# Patient Record
Sex: FEMALE | Race: OTHER | NOT HISPANIC OR LATINO
[De-identification: names, ages, dates, MRNs, and addresses within clinical notes are randomized per-mention and may not be internally consistent; named-entity substitution may affect disease eponyms.]

---

## 2021-01-27 ENCOUNTER — APPOINTMENT (OUTPATIENT)
Dept: ANTEPARTUM | Facility: CLINIC | Age: 36
End: 2021-01-27
Payer: COMMERCIAL

## 2021-01-27 ENCOUNTER — TRANSCRIPTION ENCOUNTER (OUTPATIENT)
Age: 36
End: 2021-01-27

## 2021-01-27 PROCEDURE — 76811 OB US DETAILED SNGL FETUS: CPT

## 2021-01-27 PROCEDURE — 99072 ADDL SUPL MATRL&STAF TM PHE: CPT

## 2021-02-03 ENCOUNTER — APPOINTMENT (OUTPATIENT)
Dept: ANTEPARTUM | Facility: CLINIC | Age: 36
End: 2021-02-03
Payer: COMMERCIAL

## 2021-02-03 PROCEDURE — 99072 ADDL SUPL MATRL&STAF TM PHE: CPT

## 2021-02-03 PROCEDURE — 76946 ECHO GUIDE FOR AMNIOCENTESIS: CPT

## 2021-02-03 PROCEDURE — 59000 AMNIOCENTESIS DIAGNOSTIC: CPT

## 2021-02-11 ENCOUNTER — APPOINTMENT (OUTPATIENT)
Dept: MATERNAL FETAL MEDICINE | Facility: CLINIC | Age: 36
End: 2021-02-11
Payer: COMMERCIAL

## 2021-02-11 PROBLEM — Z00.00 ENCOUNTER FOR PREVENTIVE HEALTH EXAMINATION: Status: ACTIVE | Noted: 2021-02-11

## 2021-02-11 PROCEDURE — 99205 OFFICE O/P NEW HI 60 MIN: CPT | Mod: 95

## 2021-02-11 PROCEDURE — 99215 OFFICE O/P EST HI 40 MIN: CPT | Mod: 95

## 2021-02-17 NOTE — LETTER CLOSING
[Thank you for sending this denisse, albeit complicated patient to us for MFM consultation.] : Thank you for sending this denisse, albeit complicated patient to us for MFM consultation. [If you have any questions, please do not hesitate to contact our office.] : If you have any questions, please do not hesitate to contact our office. [Sincerely,] : Sincerely,

## 2021-02-17 NOTE — DISCUSSION/SUMMARY
[FreeTextEntry1] : The incidence of NF1 in pregnancy is similar to that of the general population, ranging from approximately 1:3000 to 5000.  Increased rates of hypertensive disorders in pregnancy, fetal growth restriction, cerebrovascular disease,  labor and  delivery in patients with NF1 have been reported. There does NOT appear to be an increase in thromboembolism, acute cardiac events or maternal mortality. In terms of the effect of pregnancy on NF1, approximately 50 to 60% of patients experience growth of new or existing neurofibromas. There does appear to be a decrease in neurofibroma size in approximately 1/3 of patients postpartum, suggesting a hormonal influence on neurofibroma development.\par \par Ms. Johnson and her partner already underwent genetic counseling in this pregnancy and the results of the amniocentesis are pending. \par \par \par Recommendations:\par 1. Anesthesia consult. Will discuss need for spinal imaging for neuraxial anesthesia.\par 2. The lack of history of seizure, visual disturbances, hypertension and/or kyphoscoliosis is all reassuring. Suggest establishing care with PCP or physician with expertise/experience in caring for patients with NF1.\par 3. [employer] for surveillance of adults with NF1 suggest the following which are applicable to Ms. Johnson: Annual mammogram starting at age 30 years old and consideration for MRI between 30 to 50 years old. Mammogram and MRI can be safely performed in pregnancy and recommend screening in pregnancy rather than delaying until postpartum (may be beneficial to establish care with a breast specialist). Screen for pheochromocytoma if HTN or other sx associated with pheo such as palpitations, HA or diaphoresis. Some have also suggested routine screening prior to surgical procedures, pregnancy, labor and delivery -- as these procedures can trigger cardiovascular crisis. Suggest sending plasma-free fractionated metanephrines to r/o pheo. \par 3. Serial evaluation of fetal growth in the 3rd trimester was suggested due to the history of NF1\par 4. Screening for HTN at routine prenatal visits. Daily low-dose aspirin, 162 mg/day, to reduce the risk of preeclampsia.

## 2021-02-17 NOTE — HISTORY OF PRESENT ILLNESS
[FreeTextEntry1] : Maternal-Fetal Medicine telemedicine consultation \par Patient verbally consented to a telemedicine consultation\par Prenatal care: 1060, Dr. 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 Diagnosed as a teenager due to presence of Cafe ole spots. She reports normal eye exam in teenage years as well as a normal "body scan". She reports only cutaneous manifestations and has not required additional work up or testing. "No one really follows me for it". Reports providers were less concerned about NF1 because no issues developed in childhood/adolescence. She denies any history of seizures or visual disturbances. She denies any history of kyphoscoliosis. She denies a history of hypertension.\par \par Surgical history is notable for an appendectomy. Gynecologic history is unremarkable. Current medications include prenatal vitamins. Allergies: NKDA\par \par She is not working currently. She lives with her  and daughter. She denies a history of tobacco use. She denies alcohol or drug use in this pregnancy.  She has never received blood products but is willing to receive them if needed. \par \par Family history is unremarkable. She denies a family history of birth defects, mental retardation, developmental delay or genetic disorders. \par \par Height: 5'0" Prepregnancy weight: ~140-145#  \par Prepregnancy BMI: 28.3

## 2021-03-04 ENCOUNTER — APPOINTMENT (OUTPATIENT)
Dept: ANTEPARTUM | Facility: CLINIC | Age: 36
End: 2021-03-04
Payer: COMMERCIAL

## 2021-03-04 ENCOUNTER — ASOB RESULT (OUTPATIENT)
Age: 36
End: 2021-03-04

## 2021-03-04 PROCEDURE — 76816 OB US FOLLOW-UP PER FETUS: CPT

## 2021-03-04 PROCEDURE — 76819 FETAL BIOPHYS PROFIL W/O NST: CPT

## 2021-03-04 PROCEDURE — 99072 ADDL SUPL MATRL&STAF TM PHE: CPT

## 2021-04-15 ENCOUNTER — ASOB RESULT (OUTPATIENT)
Age: 36
End: 2021-04-15

## 2021-04-15 ENCOUNTER — APPOINTMENT (OUTPATIENT)
Dept: ANTEPARTUM | Facility: CLINIC | Age: 36
End: 2021-04-15
Payer: COMMERCIAL

## 2021-04-15 PROCEDURE — 76816 OB US FOLLOW-UP PER FETUS: CPT

## 2021-04-15 PROCEDURE — 99072 ADDL SUPL MATRL&STAF TM PHE: CPT

## 2021-04-15 PROCEDURE — 76819 FETAL BIOPHYS PROFIL W/O NST: CPT

## 2021-05-05 ENCOUNTER — TRANSCRIPTION ENCOUNTER (OUTPATIENT)
Age: 36
End: 2021-05-05

## 2021-05-06 ENCOUNTER — APPOINTMENT (OUTPATIENT)
Dept: ANTEPARTUM | Facility: CLINIC | Age: 36
End: 2021-05-06
Payer: COMMERCIAL

## 2021-05-06 ENCOUNTER — INPATIENT (INPATIENT)
Facility: HOSPITAL | Age: 36
LOS: 6 days | Discharge: HOME | End: 2021-05-13
Attending: OBSTETRICS & GYNECOLOGY | Admitting: OBSTETRICS & GYNECOLOGY
Payer: COMMERCIAL

## 2021-05-06 ENCOUNTER — RESULT REVIEW (OUTPATIENT)
Age: 36
End: 2021-05-06

## 2021-05-06 ENCOUNTER — ASOB RESULT (OUTPATIENT)
Age: 36
End: 2021-05-06

## 2021-05-06 VITALS
RESPIRATION RATE: 20 BRPM | OXYGEN SATURATION: 99 % | DIASTOLIC BLOOD PRESSURE: 68 MMHG | TEMPERATURE: 98 F | SYSTOLIC BLOOD PRESSURE: 106 MMHG | HEART RATE: 89 BPM

## 2021-05-06 DIAGNOSIS — Z90.49 ACQUIRED ABSENCE OF OTHER SPECIFIED PARTS OF DIGESTIVE TRACT: ICD-10-CM

## 2021-05-06 DIAGNOSIS — O26.899 OTHER SPECIFIED PREGNANCY RELATED CONDITIONS, UNSPECIFIED TRIMESTER: ICD-10-CM

## 2021-05-06 DIAGNOSIS — O34.211 MATERNAL CARE FOR LOW TRANSVERSE SCAR FROM PREVIOUS CESAREAN DELIVERY: ICD-10-CM

## 2021-05-06 DIAGNOSIS — T42.6X5A ADVERSE EFFECT OF OTHER ANTIEPILEPTIC AND SEDATIVE-HYPNOTIC DRUGS, INITIAL ENCOUNTER: ICD-10-CM

## 2021-05-06 DIAGNOSIS — O26.893 OTHER SPECIFIED PREGNANCY RELATED CONDITIONS, THIRD TRIMESTER: ICD-10-CM

## 2021-05-06 DIAGNOSIS — Z28.09 IMMUNIZATION NOT CARRIED OUT BECAUSE OF OTHER CONTRAINDICATION: ICD-10-CM

## 2021-05-06 DIAGNOSIS — O34.219 MATERNAL CARE FOR UNSPECIFIED TYPE SCAR FROM PREVIOUS CESAREAN DELIVERY: ICD-10-CM

## 2021-05-06 DIAGNOSIS — O36.5930 MATERNAL CARE FOR OTHER KNOWN OR SUSPECTED POOR FETAL GROWTH, THIRD TRIMESTER, NOT APPLICABLE OR UNSPECIFIED: ICD-10-CM

## 2021-05-06 DIAGNOSIS — R74.01 ELEVATION OF LEVELS OF LIVER TRANSAMINASE LEVELS: ICD-10-CM

## 2021-05-06 DIAGNOSIS — O34.03 MATERNAL CARE FOR UNSPECIFIED CONGENITAL MALFORMATION OF UTERUS, THIRD TRIMESTER: ICD-10-CM

## 2021-05-06 DIAGNOSIS — Q51.810 ARCUATE UTERUS: ICD-10-CM

## 2021-05-06 DIAGNOSIS — N17.9 ACUTE KIDNEY FAILURE, UNSPECIFIED: ICD-10-CM

## 2021-05-06 DIAGNOSIS — Z3A.36 36 WEEKS GESTATION OF PREGNANCY: ICD-10-CM

## 2021-05-06 DIAGNOSIS — Z3A.00 WEEKS OF GESTATION OF PREGNANCY NOT SPECIFIED: ICD-10-CM

## 2021-05-06 LAB
BASOPHILS # BLD AUTO: 0.04 K/UL — SIGNIFICANT CHANGE UP (ref 0–0.2)
BASOPHILS NFR BLD AUTO: 0.4 % — SIGNIFICANT CHANGE UP (ref 0–2)
BLD GP AB SCN SERPL QL: NEGATIVE — SIGNIFICANT CHANGE UP
EOSINOPHIL # BLD AUTO: 0.02 K/UL — SIGNIFICANT CHANGE UP (ref 0–0.5)
EOSINOPHIL NFR BLD AUTO: 0.2 % — SIGNIFICANT CHANGE UP (ref 0–6)
GLUCOSE BLDC GLUCOMTR-MCNC: 75 MG/DL — SIGNIFICANT CHANGE UP (ref 70–99)
HCT VFR BLD CALC: 43.6 % — SIGNIFICANT CHANGE UP (ref 34.5–45)
HGB BLD-MCNC: 14.9 G/DL — SIGNIFICANT CHANGE UP (ref 11.5–15.5)
IMM GRANULOCYTES NFR BLD AUTO: 0.8 % — SIGNIFICANT CHANGE UP (ref 0–1.5)
LYMPHOCYTES # BLD AUTO: 2.16 K/UL — SIGNIFICANT CHANGE UP (ref 1–3.3)
LYMPHOCYTES # BLD AUTO: 20.8 % — SIGNIFICANT CHANGE UP (ref 13–44)
MCHC RBC-ENTMCNC: 30 PG — SIGNIFICANT CHANGE UP (ref 27–34)
MCHC RBC-ENTMCNC: 34.2 GM/DL — SIGNIFICANT CHANGE UP (ref 32–36)
MCV RBC AUTO: 87.9 FL — SIGNIFICANT CHANGE UP (ref 80–100)
MONOCYTES # BLD AUTO: 0.58 K/UL — SIGNIFICANT CHANGE UP (ref 0–0.9)
MONOCYTES NFR BLD AUTO: 5.6 % — SIGNIFICANT CHANGE UP (ref 2–14)
NEUTROPHILS # BLD AUTO: 7.49 K/UL — HIGH (ref 1.8–7.4)
NEUTROPHILS NFR BLD AUTO: 72.2 % — SIGNIFICANT CHANGE UP (ref 43–77)
NRBC # BLD: 0 /100 WBCS — SIGNIFICANT CHANGE UP (ref 0–0)
PLATELET # BLD AUTO: 134 K/UL — LOW (ref 150–400)
RBC # BLD: 4.96 M/UL — SIGNIFICANT CHANGE UP (ref 3.8–5.2)
RBC # FLD: 13.2 % — SIGNIFICANT CHANGE UP (ref 10.3–14.5)
RH IG SCN BLD-IMP: POSITIVE — SIGNIFICANT CHANGE UP
SARS-COV-2 RNA SPEC QL NAA+PROBE: SIGNIFICANT CHANGE UP
WBC # BLD: 10.37 K/UL — SIGNIFICANT CHANGE UP (ref 3.8–10.5)
WBC # FLD AUTO: 10.37 K/UL — SIGNIFICANT CHANGE UP (ref 3.8–10.5)

## 2021-05-06 PROCEDURE — 76816 OB US FOLLOW-UP PER FETUS: CPT

## 2021-05-06 PROCEDURE — 99072 ADDL SUPL MATRL&STAF TM PHE: CPT

## 2021-05-06 PROCEDURE — 76818 FETAL BIOPHYS PROFILE W/NST: CPT

## 2021-05-06 PROCEDURE — 88307 TISSUE EXAM BY PATHOLOGIST: CPT | Mod: 26

## 2021-05-06 RX ORDER — OXYCODONE HYDROCHLORIDE 5 MG/1
5 TABLET ORAL
Refills: 0 | Status: DISCONTINUED | OUTPATIENT
Start: 2021-05-06 | End: 2021-05-13

## 2021-05-06 RX ORDER — CEFAZOLIN SODIUM 1 G
2000 VIAL (EA) INJECTION ONCE
Refills: 0 | Status: DISCONTINUED | OUTPATIENT
Start: 2021-05-06 | End: 2021-05-06

## 2021-05-06 RX ORDER — LANOLIN
1 OINTMENT (GRAM) TOPICAL EVERY 6 HOURS
Refills: 0 | Status: DISCONTINUED | OUTPATIENT
Start: 2021-05-06 | End: 2021-05-13

## 2021-05-06 RX ORDER — IBUPROFEN 200 MG
600 TABLET ORAL EVERY 6 HOURS
Refills: 0 | Status: COMPLETED | OUTPATIENT
Start: 2021-05-06 | End: 2022-04-04

## 2021-05-06 RX ORDER — KETOROLAC TROMETHAMINE 30 MG/ML
30 SYRINGE (ML) INJECTION EVERY 6 HOURS
Refills: 0 | Status: DISCONTINUED | OUTPATIENT
Start: 2021-05-06 | End: 2021-05-07

## 2021-05-06 RX ORDER — SODIUM CHLORIDE 9 MG/ML
1000 INJECTION, SOLUTION INTRAVENOUS
Refills: 0 | Status: DISCONTINUED | OUTPATIENT
Start: 2021-05-06 | End: 2021-05-10

## 2021-05-06 RX ORDER — OXYCODONE HYDROCHLORIDE 5 MG/1
5 TABLET ORAL ONCE
Refills: 0 | Status: DISCONTINUED | OUTPATIENT
Start: 2021-05-06 | End: 2021-05-13

## 2021-05-06 RX ORDER — OXYTOCIN 10 UNIT/ML
333.33 VIAL (ML) INJECTION
Qty: 20 | Refills: 0 | Status: DISCONTINUED | OUTPATIENT
Start: 2021-05-06 | End: 2021-05-06

## 2021-05-06 RX ORDER — ENOXAPARIN SODIUM 100 MG/ML
40 INJECTION SUBCUTANEOUS EVERY 24 HOURS
Refills: 0 | Status: DISCONTINUED | OUTPATIENT
Start: 2021-05-07 | End: 2021-05-11

## 2021-05-06 RX ORDER — ACETAMINOPHEN 500 MG
975 TABLET ORAL
Refills: 0 | Status: DISCONTINUED | OUTPATIENT
Start: 2021-05-06 | End: 2021-05-13

## 2021-05-06 RX ORDER — SIMETHICONE 80 MG/1
80 TABLET, CHEWABLE ORAL EVERY 4 HOURS
Refills: 0 | Status: DISCONTINUED | OUTPATIENT
Start: 2021-05-06 | End: 2021-05-13

## 2021-05-06 RX ORDER — OXYTOCIN 10 UNIT/ML
333.33 VIAL (ML) INJECTION
Qty: 20 | Refills: 0 | Status: DISCONTINUED | OUTPATIENT
Start: 2021-05-06 | End: 2021-05-13

## 2021-05-06 RX ORDER — SODIUM CHLORIDE 9 MG/ML
1000 INJECTION, SOLUTION INTRAVENOUS
Refills: 0 | Status: DISCONTINUED | OUTPATIENT
Start: 2021-05-06 | End: 2021-05-06

## 2021-05-06 RX ORDER — MAGNESIUM HYDROXIDE 400 MG/1
30 TABLET, CHEWABLE ORAL
Refills: 0 | Status: DISCONTINUED | OUTPATIENT
Start: 2021-05-06 | End: 2021-05-13

## 2021-05-06 RX ORDER — DIPHENHYDRAMINE HCL 50 MG
25 CAPSULE ORAL EVERY 6 HOURS
Refills: 0 | Status: DISCONTINUED | OUTPATIENT
Start: 2021-05-06 | End: 2021-05-13

## 2021-05-06 RX ORDER — CITRIC ACID/SODIUM CITRATE 300-500 MG
30 SOLUTION, ORAL ORAL ONCE
Refills: 0 | Status: DISCONTINUED | OUTPATIENT
Start: 2021-05-06 | End: 2021-05-06

## 2021-05-06 RX ORDER — FAMOTIDINE 10 MG/ML
20 INJECTION INTRAVENOUS ONCE
Refills: 0 | Status: DISCONTINUED | OUTPATIENT
Start: 2021-05-06 | End: 2021-05-06

## 2021-05-06 RX ORDER — SODIUM CHLORIDE 9 MG/ML
1000 INJECTION, SOLUTION INTRAVENOUS ONCE
Refills: 0 | Status: DISCONTINUED | OUTPATIENT
Start: 2021-05-06 | End: 2021-05-06

## 2021-05-06 RX ORDER — TETANUS TOXOID, REDUCED DIPHTHERIA TOXOID AND ACELLULAR PERTUSSIS VACCINE, ADSORBED 5; 2.5; 8; 8; 2.5 [IU]/.5ML; [IU]/.5ML; UG/.5ML; UG/.5ML; UG/.5ML
0.5 SUSPENSION INTRAMUSCULAR ONCE
Refills: 0 | Status: DISCONTINUED | OUTPATIENT
Start: 2021-05-06 | End: 2021-05-13

## 2021-05-06 RX ADMIN — Medication 975 MILLIGRAM(S): at 18:36

## 2021-05-06 RX ADMIN — Medication 1000 MILLIUNIT(S)/MIN: at 14:16

## 2021-05-06 RX ADMIN — Medication 30 MILLIGRAM(S): at 15:09

## 2021-05-06 RX ADMIN — Medication 30 MILLIGRAM(S): at 21:33

## 2021-05-06 RX ADMIN — FAMOTIDINE 20 MILLIGRAM(S): 10 INJECTION INTRAVENOUS at 12:15

## 2021-05-06 RX ADMIN — Medication 30 MILLILITER(S): at 12:15

## 2021-05-06 RX ADMIN — Medication 30 MILLIGRAM(S): at 21:03

## 2021-05-06 RX ADMIN — Medication 30 MILLIGRAM(S): at 15:48

## 2021-05-06 RX ADMIN — Medication 975 MILLIGRAM(S): at 19:20

## 2021-05-06 RX ADMIN — Medication 100 MILLIGRAM(S): at 12:15

## 2021-05-06 NOTE — PATIENT PROFILE OB - VISION (WITH CORRECTIVE LENSES IF THE PATIENT USUALLY WEARS THEM):
Normal vision: sees adequately in most situations; can see medication labels, newsprint
no rash/no itching/no dryness

## 2021-05-07 LAB
ALBUMIN SERPL ELPH-MCNC: 3.3 G/DL — SIGNIFICANT CHANGE UP (ref 3.3–5)
ALP SERPL-CCNC: 116 U/L — SIGNIFICANT CHANGE UP (ref 40–120)
ALT FLD-CCNC: 23 U/L — SIGNIFICANT CHANGE UP (ref 10–45)
ANION GAP SERPL CALC-SCNC: 11 MMOL/L — SIGNIFICANT CHANGE UP (ref 5–17)
APTT BLD: 31.5 SEC — SIGNIFICANT CHANGE UP (ref 27.5–35.5)
AST SERPL-CCNC: 30 U/L — SIGNIFICANT CHANGE UP (ref 10–40)
BASOPHILS # BLD AUTO: 0.02 K/UL — SIGNIFICANT CHANGE UP (ref 0–0.2)
BASOPHILS NFR BLD AUTO: 0.1 % — SIGNIFICANT CHANGE UP (ref 0–2)
BILIRUB SERPL-MCNC: 0.2 MG/DL — SIGNIFICANT CHANGE UP (ref 0.2–1.2)
BUN SERPL-MCNC: 12 MG/DL — SIGNIFICANT CHANGE UP (ref 7–23)
CALCIUM SERPL-MCNC: 9.4 MG/DL — SIGNIFICANT CHANGE UP (ref 8.4–10.5)
CHLORIDE SERPL-SCNC: 104 MMOL/L — SIGNIFICANT CHANGE UP (ref 96–108)
CO2 SERPL-SCNC: 20 MMOL/L — LOW (ref 22–31)
COVID-19 SPIKE DOMAIN AB INTERP: POSITIVE
COVID-19 SPIKE DOMAIN ANTIBODY RESULT: >250 U/ML — HIGH
CREAT SERPL-MCNC: 0.78 MG/DL — SIGNIFICANT CHANGE UP (ref 0.5–1.3)
EOSINOPHIL # BLD AUTO: 0.01 K/UL — SIGNIFICANT CHANGE UP (ref 0–0.5)
EOSINOPHIL NFR BLD AUTO: 0.1 % — SIGNIFICANT CHANGE UP (ref 0–6)
FIBRINOGEN PPP-MCNC: 317 MG/DL — SIGNIFICANT CHANGE UP (ref 258–438)
GLUCOSE SERPL-MCNC: 85 MG/DL — SIGNIFICANT CHANGE UP (ref 70–99)
HCT VFR BLD CALC: 35.9 % — SIGNIFICANT CHANGE UP (ref 34.5–45)
HGB BLD-MCNC: 12 G/DL — SIGNIFICANT CHANGE UP (ref 11.5–15.5)
IMM GRANULOCYTES NFR BLD AUTO: 1 % — SIGNIFICANT CHANGE UP (ref 0–1.5)
INR BLD: 0.94 — SIGNIFICANT CHANGE UP (ref 0.88–1.16)
LDH SERPL L TO P-CCNC: 266 U/L — HIGH (ref 50–242)
LYMPHOCYTES # BLD AUTO: 1.72 K/UL — SIGNIFICANT CHANGE UP (ref 1–3.3)
LYMPHOCYTES # BLD AUTO: 12.4 % — LOW (ref 13–44)
MCHC RBC-ENTMCNC: 29.7 PG — SIGNIFICANT CHANGE UP (ref 27–34)
MCHC RBC-ENTMCNC: 33.4 GM/DL — SIGNIFICANT CHANGE UP (ref 32–36)
MCV RBC AUTO: 88.9 FL — SIGNIFICANT CHANGE UP (ref 80–100)
MONOCYTES # BLD AUTO: 1.09 K/UL — HIGH (ref 0–0.9)
MONOCYTES NFR BLD AUTO: 7.9 % — SIGNIFICANT CHANGE UP (ref 2–14)
NEUTROPHILS # BLD AUTO: 10.85 K/UL — HIGH (ref 1.8–7.4)
NEUTROPHILS NFR BLD AUTO: 78.5 % — HIGH (ref 43–77)
NRBC # BLD: 0 /100 WBCS — SIGNIFICANT CHANGE UP (ref 0–0)
PLATELET # BLD AUTO: 111 K/UL — LOW (ref 150–400)
POTASSIUM SERPL-MCNC: 4.6 MMOL/L — SIGNIFICANT CHANGE UP (ref 3.5–5.3)
POTASSIUM SERPL-SCNC: 4.6 MMOL/L — SIGNIFICANT CHANGE UP (ref 3.5–5.3)
PROT SERPL-MCNC: 5.9 G/DL — LOW (ref 6–8.3)
PROTHROM AB SERPL-ACNC: 11.3 SEC — SIGNIFICANT CHANGE UP (ref 10.6–13.6)
RBC # BLD: 4.04 M/UL — SIGNIFICANT CHANGE UP (ref 3.8–5.2)
RBC # FLD: 13 % — SIGNIFICANT CHANGE UP (ref 10.3–14.5)
SARS-COV-2 IGG+IGM SERPL QL IA: >250 U/ML — HIGH
SARS-COV-2 IGG+IGM SERPL QL IA: POSITIVE
SODIUM SERPL-SCNC: 135 MMOL/L — SIGNIFICANT CHANGE UP (ref 135–145)
T PALLIDUM AB TITR SER: NEGATIVE — SIGNIFICANT CHANGE UP
URATE SERPL-MCNC: 5.6 MG/DL — SIGNIFICANT CHANGE UP (ref 2.5–7)
WBC # BLD: 13.83 K/UL — HIGH (ref 3.8–10.5)
WBC # FLD AUTO: 13.83 K/UL — HIGH (ref 3.8–10.5)

## 2021-05-07 RX ORDER — IBUPROFEN 200 MG
600 TABLET ORAL EVERY 6 HOURS
Refills: 0 | Status: DISCONTINUED | OUTPATIENT
Start: 2021-05-07 | End: 2021-05-11

## 2021-05-07 RX ADMIN — Medication 975 MILLIGRAM(S): at 06:39

## 2021-05-07 RX ADMIN — Medication 600 MILLIGRAM(S): at 10:11

## 2021-05-07 RX ADMIN — Medication 975 MILLIGRAM(S): at 01:00

## 2021-05-07 RX ADMIN — Medication 600 MILLIGRAM(S): at 09:15

## 2021-05-07 RX ADMIN — Medication 30 MILLIGRAM(S): at 03:52

## 2021-05-07 RX ADMIN — Medication 600 MILLIGRAM(S): at 20:58

## 2021-05-07 RX ADMIN — Medication 600 MILLIGRAM(S): at 21:58

## 2021-05-07 RX ADMIN — Medication 975 MILLIGRAM(S): at 00:00

## 2021-05-07 RX ADMIN — Medication 975 MILLIGRAM(S): at 12:36

## 2021-05-07 RX ADMIN — ENOXAPARIN SODIUM 40 MILLIGRAM(S): 100 INJECTION SUBCUTANEOUS at 05:39

## 2021-05-07 RX ADMIN — Medication 975 MILLIGRAM(S): at 11:49

## 2021-05-07 RX ADMIN — Medication 600 MILLIGRAM(S): at 14:57

## 2021-05-07 RX ADMIN — Medication 600 MILLIGRAM(S): at 15:54

## 2021-05-07 RX ADMIN — Medication 1 TABLET(S): at 11:49

## 2021-05-07 RX ADMIN — Medication 30 MILLIGRAM(S): at 02:52

## 2021-05-07 RX ADMIN — Medication 975 MILLIGRAM(S): at 19:20

## 2021-05-07 RX ADMIN — Medication 975 MILLIGRAM(S): at 05:39

## 2021-05-07 RX ADMIN — Medication 975 MILLIGRAM(S): at 18:32

## 2021-05-07 NOTE — PROGRESS NOTE ADULT - ASSESSMENT
A/P   35y  s/p  section, POD #1, stable  -  Pain: PO motrin and tylenol, oxycodone PRN  -- gHTN: mild range BP, no toxic sx, full labs this AM  -  Post-operatively labs: post-op Hgb , hemodynamically stable, no symptoms of anemia   -  GI: tolerating clears, passing gas, ADAT  -  : valle in situ; DC this AM, f/u TOV  -  DVT prophylaxis: ambulation, SCDs, lovenox  -  Dispo: POD 3 or 4

## 2021-05-07 NOTE — LACTATION INITIAL EVALUATION - LACTATION INTERVENTIONS
Mom needs to arrange to get a breast pump for home. I encouraged her to contact her [employer] and get a prescription from her OB/initiate skin to skin/initiate hand expression routine/initiate dual electric pump routine

## 2021-05-08 RX ADMIN — Medication 600 MILLIGRAM(S): at 08:47

## 2021-05-08 RX ADMIN — Medication 975 MILLIGRAM(S): at 07:17

## 2021-05-08 RX ADMIN — Medication 975 MILLIGRAM(S): at 06:20

## 2021-05-08 RX ADMIN — Medication 975 MILLIGRAM(S): at 19:30

## 2021-05-08 RX ADMIN — Medication 1 TABLET(S): at 11:33

## 2021-05-08 RX ADMIN — Medication 975 MILLIGRAM(S): at 01:14

## 2021-05-08 RX ADMIN — Medication 600 MILLIGRAM(S): at 04:01

## 2021-05-08 RX ADMIN — ENOXAPARIN SODIUM 40 MILLIGRAM(S): 100 INJECTION SUBCUTANEOUS at 06:21

## 2021-05-08 RX ADMIN — Medication 975 MILLIGRAM(S): at 23:55

## 2021-05-08 RX ADMIN — Medication 600 MILLIGRAM(S): at 03:28

## 2021-05-08 RX ADMIN — Medication 975 MILLIGRAM(S): at 18:32

## 2021-05-08 RX ADMIN — Medication 600 MILLIGRAM(S): at 15:12

## 2021-05-08 RX ADMIN — Medication 975 MILLIGRAM(S): at 00:14

## 2021-05-08 RX ADMIN — Medication 975 MILLIGRAM(S): at 11:33

## 2021-05-08 RX ADMIN — Medication 600 MILLIGRAM(S): at 21:30

## 2021-05-08 RX ADMIN — Medication 600 MILLIGRAM(S): at 09:31

## 2021-05-08 RX ADMIN — Medication 975 MILLIGRAM(S): at 12:19

## 2021-05-08 RX ADMIN — Medication 600 MILLIGRAM(S): at 20:28

## 2021-05-08 RX ADMIN — Medication 600 MILLIGRAM(S): at 15:45

## 2021-05-08 NOTE — PROGRESS NOTE ADULT - ASSESSMENT
A/P: 35y s/p  section, POD#2, stable  -  Pain: PO motrin q6hrs, tylenol q8hrs, oxycodone for severe pain PRN  -  Post-operatively labs: post-op Hgb 12.0, hemodynamically stable, no symptoms of anemia   -  GI: tolerating regular diet, passing gas  -  : s/p valle , urinating without difficulty  -  DVT prophylaxis: encouraged increased ambulation, SCDs, Lovenox  -  Dispo: POD 3 or 4

## 2021-05-09 RX ADMIN — Medication 975 MILLIGRAM(S): at 00:55

## 2021-05-09 RX ADMIN — Medication 975 MILLIGRAM(S): at 18:30

## 2021-05-09 RX ADMIN — SIMETHICONE 80 MILLIGRAM(S): 80 TABLET, CHEWABLE ORAL at 08:48

## 2021-05-09 RX ADMIN — Medication 600 MILLIGRAM(S): at 20:35

## 2021-05-09 RX ADMIN — ENOXAPARIN SODIUM 40 MILLIGRAM(S): 100 INJECTION SUBCUTANEOUS at 06:23

## 2021-05-09 RX ADMIN — Medication 600 MILLIGRAM(S): at 09:30

## 2021-05-09 RX ADMIN — Medication 600 MILLIGRAM(S): at 15:30

## 2021-05-09 RX ADMIN — Medication 975 MILLIGRAM(S): at 06:18

## 2021-05-09 RX ADMIN — Medication 975 MILLIGRAM(S): at 12:30

## 2021-05-09 RX ADMIN — Medication 600 MILLIGRAM(S): at 09:00

## 2021-05-09 RX ADMIN — Medication 975 MILLIGRAM(S): at 07:10

## 2021-05-09 RX ADMIN — Medication 600 MILLIGRAM(S): at 15:00

## 2021-05-09 RX ADMIN — SIMETHICONE 80 MILLIGRAM(S): 80 TABLET, CHEWABLE ORAL at 15:00

## 2021-05-09 RX ADMIN — Medication 1 TABLET(S): at 11:24

## 2021-05-09 RX ADMIN — Medication 600 MILLIGRAM(S): at 21:35

## 2021-05-09 RX ADMIN — Medication 975 MILLIGRAM(S): at 18:00

## 2021-05-09 RX ADMIN — Medication 975 MILLIGRAM(S): at 12:00

## 2021-05-09 NOTE — PROGRESS NOTE ADULT - ASSESSMENT
A/P: 36yo POD#3 s/p LTCS.  Patient is stable and is doing well post-operatively.  - Continue motrin, tylenol, oxycodone PRN for pain control.  - Increase ambulation; lovenox   - Continue regular diet  - Discharge planning    Darren Dotson PGY2

## 2021-05-10 LAB
ALBUMIN SERPL ELPH-MCNC: 3 G/DL — LOW (ref 3.3–5)
ALBUMIN SERPL ELPH-MCNC: 3.3 G/DL — SIGNIFICANT CHANGE UP (ref 3.3–5)
ALP SERPL-CCNC: 138 U/L — HIGH (ref 40–120)
ALP SERPL-CCNC: 155 U/L — HIGH (ref 40–120)
ALT FLD-CCNC: 36 U/L — SIGNIFICANT CHANGE UP (ref 10–45)
ALT FLD-CCNC: 49 U/L — HIGH (ref 10–45)
ANION GAP SERPL CALC-SCNC: 12 MMOL/L — SIGNIFICANT CHANGE UP (ref 5–17)
ANION GAP SERPL CALC-SCNC: 15 MMOL/L — SIGNIFICANT CHANGE UP (ref 5–17)
AST SERPL-CCNC: 35 U/L — SIGNIFICANT CHANGE UP (ref 10–40)
AST SERPL-CCNC: 45 U/L — HIGH (ref 10–40)
BILIRUB SERPL-MCNC: 0.3 MG/DL — SIGNIFICANT CHANGE UP (ref 0.2–1.2)
BILIRUB SERPL-MCNC: 0.3 MG/DL — SIGNIFICANT CHANGE UP (ref 0.2–1.2)
BUN SERPL-MCNC: 20 MG/DL — SIGNIFICANT CHANGE UP (ref 7–23)
BUN SERPL-MCNC: 23 MG/DL — SIGNIFICANT CHANGE UP (ref 7–23)
CALCIUM SERPL-MCNC: 8.5 MG/DL — SIGNIFICANT CHANGE UP (ref 8.4–10.5)
CALCIUM SERPL-MCNC: 8.8 MG/DL — SIGNIFICANT CHANGE UP (ref 8.4–10.5)
CHLORIDE SERPL-SCNC: 107 MMOL/L — SIGNIFICANT CHANGE UP (ref 96–108)
CHLORIDE SERPL-SCNC: 111 MMOL/L — HIGH (ref 96–108)
CO2 SERPL-SCNC: 20 MMOL/L — LOW (ref 22–31)
CO2 SERPL-SCNC: 21 MMOL/L — LOW (ref 22–31)
CREAT SERPL-MCNC: 1.22 MG/DL — SIGNIFICANT CHANGE UP (ref 0.5–1.3)
CREAT SERPL-MCNC: 1.23 MG/DL — SIGNIFICANT CHANGE UP (ref 0.5–1.3)
CREAT SERPL-MCNC: 1.24 MG/DL — SIGNIFICANT CHANGE UP (ref 0.5–1.3)
GLUCOSE SERPL-MCNC: 138 MG/DL — HIGH (ref 70–99)
GLUCOSE SERPL-MCNC: 86 MG/DL — SIGNIFICANT CHANGE UP (ref 70–99)
HCT VFR BLD CALC: 34.7 % — SIGNIFICANT CHANGE UP (ref 34.5–45)
HGB BLD-MCNC: 11.6 G/DL — SIGNIFICANT CHANGE UP (ref 11.5–15.5)
LDH SERPL L TO P-CCNC: 260 U/L — HIGH (ref 50–242)
MAGNESIUM SERPL-MCNC: 7.3 MG/DL — CRITICAL HIGH (ref 1.6–2.6)
MAGNESIUM SERPL-MCNC: 9 MG/DL — CRITICAL HIGH (ref 1.6–2.6)
MCHC RBC-ENTMCNC: 29.8 PG — SIGNIFICANT CHANGE UP (ref 27–34)
MCHC RBC-ENTMCNC: 33.4 GM/DL — SIGNIFICANT CHANGE UP (ref 32–36)
MCV RBC AUTO: 89.2 FL — SIGNIFICANT CHANGE UP (ref 80–100)
NRBC # BLD: 0 /100 WBCS — SIGNIFICANT CHANGE UP (ref 0–0)
PLATELET # BLD AUTO: 119 K/UL — LOW (ref 150–400)
POTASSIUM SERPL-MCNC: 3.4 MMOL/L — LOW (ref 3.5–5.3)
POTASSIUM SERPL-MCNC: 3.7 MMOL/L — SIGNIFICANT CHANGE UP (ref 3.5–5.3)
POTASSIUM SERPL-SCNC: 3.4 MMOL/L — LOW (ref 3.5–5.3)
POTASSIUM SERPL-SCNC: 3.7 MMOL/L — SIGNIFICANT CHANGE UP (ref 3.5–5.3)
PROT SERPL-MCNC: 5.8 G/DL — LOW (ref 6–8.3)
PROT SERPL-MCNC: 6.7 G/DL — SIGNIFICANT CHANGE UP (ref 6–8.3)
RBC # BLD: 3.89 M/UL — SIGNIFICANT CHANGE UP (ref 3.8–5.2)
RBC # FLD: 13.3 % — SIGNIFICANT CHANGE UP (ref 10.3–14.5)
SODIUM SERPL-SCNC: 143 MMOL/L — SIGNIFICANT CHANGE UP (ref 135–145)
SODIUM SERPL-SCNC: 143 MMOL/L — SIGNIFICANT CHANGE UP (ref 135–145)
SURGICAL PATHOLOGY STUDY: SIGNIFICANT CHANGE UP
URATE SERPL-MCNC: 6.6 MG/DL — SIGNIFICANT CHANGE UP (ref 2.5–7)
WBC # BLD: 10.26 K/UL — SIGNIFICANT CHANGE UP (ref 3.8–10.5)
WBC # FLD AUTO: 10.26 K/UL — SIGNIFICANT CHANGE UP (ref 3.8–10.5)

## 2021-05-10 RX ORDER — POTASSIUM CHLORIDE 20 MEQ
20 PACKET (EA) ORAL ONCE
Refills: 0 | Status: COMPLETED | OUTPATIENT
Start: 2021-05-10 | End: 2021-05-10

## 2021-05-10 RX ORDER — MAGNESIUM SULFATE 500 MG/ML
2 VIAL (ML) INJECTION
Qty: 40 | Refills: 0 | Status: DISCONTINUED | OUTPATIENT
Start: 2021-05-10 | End: 2021-05-12

## 2021-05-10 RX ORDER — NIFEDIPINE 30 MG
30 TABLET, EXTENDED RELEASE 24 HR ORAL EVERY 24 HOURS
Refills: 0 | Status: DISCONTINUED | OUTPATIENT
Start: 2021-05-10 | End: 2021-05-11

## 2021-05-10 RX ORDER — SODIUM CHLORIDE 9 MG/ML
1000 INJECTION, SOLUTION INTRAVENOUS
Refills: 0 | Status: DISCONTINUED | OUTPATIENT
Start: 2021-05-10 | End: 2021-05-11

## 2021-05-10 RX ORDER — MAGNESIUM SULFATE 500 MG/ML
4 VIAL (ML) INJECTION ONCE
Refills: 0 | Status: COMPLETED | OUTPATIENT
Start: 2021-05-10 | End: 2021-05-10

## 2021-05-10 RX ORDER — ONDANSETRON 8 MG/1
8 TABLET, FILM COATED ORAL ONCE
Refills: 0 | Status: COMPLETED | OUTPATIENT
Start: 2021-05-10 | End: 2021-05-10

## 2021-05-10 RX ORDER — NIFEDIPINE 30 MG
10 TABLET, EXTENDED RELEASE 24 HR ORAL ONCE
Refills: 0 | Status: COMPLETED | OUTPATIENT
Start: 2021-05-10 | End: 2021-05-10

## 2021-05-10 RX ORDER — POTASSIUM CHLORIDE 20 MEQ
40 PACKET (EA) ORAL ONCE
Refills: 0 | Status: COMPLETED | OUTPATIENT
Start: 2021-05-10 | End: 2021-05-10

## 2021-05-10 RX ADMIN — Medication 975 MILLIGRAM(S): at 23:32

## 2021-05-10 RX ADMIN — Medication 30 MILLIGRAM(S): at 11:44

## 2021-05-10 RX ADMIN — Medication 975 MILLIGRAM(S): at 11:45

## 2021-05-10 RX ADMIN — ENOXAPARIN SODIUM 40 MILLIGRAM(S): 100 INJECTION SUBCUTANEOUS at 07:17

## 2021-05-10 RX ADMIN — Medication 10 MILLIGRAM(S): at 06:34

## 2021-05-10 RX ADMIN — Medication 975 MILLIGRAM(S): at 07:17

## 2021-05-10 RX ADMIN — Medication 50 GM/HR: at 07:45

## 2021-05-10 RX ADMIN — Medication 20 MILLIEQUIVALENT(S): at 23:32

## 2021-05-10 RX ADMIN — Medication 1 TABLET(S): at 11:45

## 2021-05-10 RX ADMIN — Medication 600 MILLIGRAM(S): at 21:05

## 2021-05-10 RX ADMIN — Medication 200 GRAM(S): at 07:26

## 2021-05-10 RX ADMIN — Medication 600 MILLIGRAM(S): at 16:47

## 2021-05-10 RX ADMIN — Medication 600 MILLIGRAM(S): at 20:25

## 2021-05-10 RX ADMIN — Medication 975 MILLIGRAM(S): at 08:00

## 2021-05-10 RX ADMIN — ONDANSETRON 8 MILLIGRAM(S): 8 TABLET, FILM COATED ORAL at 13:19

## 2021-05-10 RX ADMIN — Medication 40 MILLIEQUIVALENT(S): at 20:22

## 2021-05-10 RX ADMIN — Medication 975 MILLIGRAM(S): at 00:03

## 2021-05-10 RX ADMIN — Medication 975 MILLIGRAM(S): at 01:00

## 2021-05-10 NOTE — PROGRESS NOTE ADULT - ASSESSMENT
A/P: 36yo POD#4 s/p LTCS now meeting criteria for PEC w/SF (BP)  - PEC w/SF: s/p 10mg IR nifedipine. Continue IV Mg 2g/hr, started on nifedipine 30mg qd. Continue to monitor VS  - Continue motrin, tylenol, oxycodone PRN for pain control.  - Increase ambulation; lovenox   - Continue regular diet  - Discharge planning

## 2021-05-10 NOTE — PROVIDER CONTACT NOTE (CRITICAL VALUE NOTIFICATION) - BACKGROUND
Pt with elevated BP. NIMO magnesium sulphate commenced at 07;26 at 2gm/HR  this am and decreased to 1.5 gm/hr at 14:29

## 2021-05-10 NOTE — CHART NOTE - NSCHARTNOTEFT_GEN_A_CORE
Patient evaluated at bedside after pt starting to feeling dizzy/lightheaded. Pt also mentions not being able to focus her vision. Denies epigastric pain/shortness of breath. No scotoma or spots in vision. No SOB, no CP. Decision made to drop Mag to 1.5/hr given Cr of 1.23 and pt showing clinical signs of possible mag toxicity. F/u mag serum level to result shortly.    T(C): 36.3 (05-10-21 @ 13:55), Max: 36.6 (05-10-21 @ 08:00)  HR: 94 (05-10-21 @ 14:05) (73 - 107)  BP: 143/92 (05-10-21 @ 14:05) (120/80 - 166/98)  RR: 18 (05-10-21 @ 14:05) (16 - 18)  SpO2: 97% (05-10-21 @ 14:05) (95% - 100%)    Gen: NAD  Card: RRR, nml S1/S2, no murmurs or rubs  Pulm: CTAB, no wheezes or rales  Abd: soft, nontender, no rebound or guarding, no epigastric tenderness  : voiding spontaneously, no lochia noted on pad  Ext: patellar reflexes 2/2 b/l

## 2021-05-10 NOTE — CHART NOTE - NSCHARTNOTEFT_GEN_A_CORE
Patient evaluated at bedside for clinical magnesium check. Magnesium has been OFF since ~ 1430. Pt is doing well. Pt denies visual disturbances including scotoma, headache, right upper quadrant pain, or leg swelling. Also denies nausea/vomiting/epigastric pain/shortness of breath. No excessive fatigue or weakness. Pain well controlled.     T(C): 36.5 (05-10-21 @ 16:00), Max: 36.6 (05-10-21 @ 08:00)  HR: 86 (05-10-21 @ 18:00) (75 - 104)  BP: 149/89 (05-10-21 @ 18:00) (121/84 - 149/97)  RR: 16 (05-10-21 @ 18:00) (16 - 18)  SpO2: 96% (05-10-21 @ 18:00) (95% - 99%)    Gen: NAD  Card: RRR, nml S1/S2, no murmurs or rubs  Pulm: CTAB, no wheezes or rales  Abd: soft, nontender, no rebound or guarding, no epigastric tenderness  : voiding spontaneously, no lochia noted on pad  Ext: biceps reflexes 2/2 b/l                          11.6   10.26 )-----------( 119      ( 10 May 2021 07:29 )             34.7     05-10    143  |  107  |  20  ----------------------------<  138<H>  3.4<L>   |  21<L>  |  1.22    Ca    8.8      10 May 2021 17:54  Mg     7.3     05-10    TPro  6.7  /  Alb  3.3  /  TBili  0.3  /  DBili  x   /  AST  45<H>  /  ALT  49<H>  /  AlkPhos  155<H>  05-10        05-10-21 @ 07:01  -  05-10-21 @ 19:45  --------------------------------------------------------  IN: 287.5 mL / OUT: 3930 mL / NET: -3642.5 mL        A&P: 35yF s/p rCS at 36+1wks c/b preeclampsia with severe features s/p IV Mag which was paused after mag toxicity    1. Preeclampsia:   Will continue to monitor full labs, urine FeNa and AM. Currently mag serum level 7.3. Pt now asymptomatic.   No complaints currently.   BP controlled on Nifedipine 30XL QD.  2. GI/Diet: clears as tolerated  3. Pain: PO pain medications PRN, hold NSAIDs  4. : monitor strict, Is and Os

## 2021-05-10 NOTE — CHART NOTE - NSCHARTNOTEFT_GEN_A_CORE
Patient evaluated at bedside for clinical magnesium check. Pt is doing well but feels some mild flushing and is groggy. Pt denies visual disturbances including scotoma, headache, right upper quadrant pain, or leg swelling. Also denies nausea/vomiting/epigastric pain/shortness of breath. No excessive fatigue or weakness. Pain well controlled.     T(C): 36.3 (05-10-21 @ 13:55), Max: 36.6 (05-10-21 @ 08:00)  HR: 94 (05-10-21 @ 14:05) (73 - 107)  BP: 143/92 (05-10-21 @ 14:05) (120/80 - 166/98)  RR: 18 (05-10-21 @ 14:05) (16 - 18)  SpO2: 97% (05-10-21 @ 14:05) (95% - 100%)    Gen: NAD  Card: RRR, nml S1/S2, no murmurs or rubs  Pulm: CTAB, no wheezes or rales  Abd: soft, nontender, no rebound or guarding, no epigastric tenderness  : voiding spontaneously, no lochia noted on pad  Ext: patellar reflexes 2/2 b/l                          11.6   10.26 )-----------( 119      ( 10 May 2021 07:29 )             34.7     05-10    143  |  111<H>  |  23  ----------------------------<  86  3.7   |  20<L>  |  1.23    Ca    8.5      10 May 2021 07:29  Mg     9.0     05-10    TPro  5.8<L>  /  Alb  3.0<L>  /  TBili  0.3  /  DBili  x   /  AST  35  /  ALT  36  /  AlkPhos  138<H>  05-10        05-10-21 @ 07:01  -  05-10-21 @ 14:54  --------------------------------------------------------  IN: 0 mL / OUT: 2980 mL / NET: -2980 mL          A&P: 35yF s/p rCS at 36+1wks c/b preeclampsia with severe features.      1. Preeclampsia:   Continue IV Magnesium @2g/hr for 24 hrs post delivery.   Magnesium clinical checks and serum assay q6 hrs.  Next Mg check @ 1930. f/u mag serum level.  No complaints currently.   BP controlled.  2. GI/Diet: clears as tolerated  3. Pain: PO pain medications PRN, hold NSAIDs  4. : monitor strict, Is and Os Patient evaluated at bedside for clinical magnesium check. Pt is doing well but feels some mild flushing and is groggy. Pt denies visual disturbances including scotoma, headache, right upper quadrant pain, or leg swelling. Also denies nausea/vomiting/epigastric pain/shortness of breath. No excessive fatigue or weakness. Pain well controlled.     T(C): 36.3 (05-10-21 @ 13:55), Max: 36.6 (05-10-21 @ 08:00)  HR: 94 (05-10-21 @ 14:05) (73 - 107)  BP: 143/92 (05-10-21 @ 14:05) (120/80 - 166/98)  RR: 18 (05-10-21 @ 14:05) (16 - 18)  SpO2: 97% (05-10-21 @ 14:05) (95% - 100%)    Gen: NAD  Card: RRR, nml S1/S2, no murmurs or rubs  Pulm: CTAB, no wheezes or rales  Abd: soft, nontender, no rebound or guarding, no epigastric tenderness  : voiding spontaneously, no lochia noted on pad  Ext: patellar reflexes 2/2 b/l                          11.6   10.26 )-----------( 119      ( 10 May 2021 07:29 )             34.7     05-10    143  |  111<H>  |  23  ----------------------------<  86  3.7   |  20<L>  |  1.23    Ca    8.5      10 May 2021 07:29  Mg     9.0     05-10    TPro  5.8<L>  /  Alb  3.0<L>  /  TBili  0.3  /  DBili  x   /  AST  35  /  ALT  36  /  AlkPhos  138<H>  05-10        05-10-21 @ 07:01  -  05-10-21 @ 14:54  --------------------------------------------------------  IN: 0 mL / OUT: 2980 mL / NET: -2980 mL          A&P: 35yF s/p rCS at 36+1wks c/b preeclampsia with severe features.      1. Preeclampsia:   Continue IV Magnesium @2g/hr for 24 hrs post delivery.   Magnesium clinical checks and serum assay q6 hrs.  Next Mg check @ 1930. f/u mag serum level.  No complaints currently.   BP controlled on Nifedipine 30XL QD.   2. GI/Diet: clears as tolerated  3. Pain: PO pain medications PRN, hold NSAIDs  4. : monitor strict, Is and Os

## 2021-05-11 DIAGNOSIS — O14.90 UNSPECIFIED PRE-ECLAMPSIA, UNSPECIFIED TRIMESTER: ICD-10-CM

## 2021-05-11 LAB
ALBUMIN SERPL ELPH-MCNC: 3 G/DL — LOW (ref 3.3–5)
ALP SERPL-CCNC: 146 U/L — HIGH (ref 40–120)
ALT FLD-CCNC: 49 U/L — HIGH (ref 10–45)
ANION GAP SERPL CALC-SCNC: 12 MMOL/L — SIGNIFICANT CHANGE UP (ref 5–17)
APPEARANCE UR: CLEAR — SIGNIFICANT CHANGE UP
AST SERPL-CCNC: 38 U/L — SIGNIFICANT CHANGE UP (ref 10–40)
BACTERIA # UR AUTO: PRESENT /HPF
BASOPHILS # BLD AUTO: 0.02 K/UL — SIGNIFICANT CHANGE UP (ref 0–0.2)
BASOPHILS NFR BLD AUTO: 0.2 % — SIGNIFICANT CHANGE UP (ref 0–2)
BILIRUB SERPL-MCNC: 0.3 MG/DL — SIGNIFICANT CHANGE UP (ref 0.2–1.2)
BILIRUB UR-MCNC: NEGATIVE — SIGNIFICANT CHANGE UP
BUN SERPL-MCNC: 18 MG/DL — SIGNIFICANT CHANGE UP (ref 7–23)
CALCIUM SERPL-MCNC: 8.2 MG/DL — LOW (ref 8.4–10.5)
CHLORIDE SERPL-SCNC: 106 MMOL/L — SIGNIFICANT CHANGE UP (ref 96–108)
CO2 SERPL-SCNC: 21 MMOL/L — LOW (ref 22–31)
COLOR SPEC: YELLOW — SIGNIFICANT CHANGE UP
CREAT ?TM UR-MCNC: 24 MG/DL — SIGNIFICANT CHANGE UP
CREAT ?TM UR-MCNC: 27 MG/DL — SIGNIFICANT CHANGE UP
CREAT SERPL-MCNC: 1.4 MG/DL — HIGH (ref 0.5–1.3)
DIFF PNL FLD: ABNORMAL
EOSINOPHIL # BLD AUTO: 0.03 K/UL — SIGNIFICANT CHANGE UP (ref 0–0.5)
EOSINOPHIL NFR BLD AUTO: 0.3 % — SIGNIFICANT CHANGE UP (ref 0–6)
EPI CELLS # UR: ABNORMAL /HPF (ref 0–5)
GLUCOSE SERPL-MCNC: 111 MG/DL — HIGH (ref 70–99)
GLUCOSE UR QL: NEGATIVE — SIGNIFICANT CHANGE UP
HCT VFR BLD CALC: 38.4 % — SIGNIFICANT CHANGE UP (ref 34.5–45)
HGB BLD-MCNC: 12.4 G/DL — SIGNIFICANT CHANGE UP (ref 11.5–15.5)
IMM GRANULOCYTES NFR BLD AUTO: 0.7 % — SIGNIFICANT CHANGE UP (ref 0–1.5)
KETONES UR-MCNC: NEGATIVE — SIGNIFICANT CHANGE UP
LEUKOCYTE ESTERASE UR-ACNC: NEGATIVE — SIGNIFICANT CHANGE UP
LYMPHOCYTES # BLD AUTO: 1.17 K/UL — SIGNIFICANT CHANGE UP (ref 1–3.3)
LYMPHOCYTES # BLD AUTO: 10 % — LOW (ref 13–44)
MAGNESIUM SERPL-MCNC: 4.2 MG/DL — HIGH (ref 1.6–2.6)
MCHC RBC-ENTMCNC: 29.3 PG — SIGNIFICANT CHANGE UP (ref 27–34)
MCHC RBC-ENTMCNC: 32.3 GM/DL — SIGNIFICANT CHANGE UP (ref 32–36)
MCV RBC AUTO: 90.8 FL — SIGNIFICANT CHANGE UP (ref 80–100)
MONOCYTES # BLD AUTO: 0.8 K/UL — SIGNIFICANT CHANGE UP (ref 0–0.9)
MONOCYTES NFR BLD AUTO: 6.8 % — SIGNIFICANT CHANGE UP (ref 2–14)
NEUTROPHILS # BLD AUTO: 9.6 K/UL — HIGH (ref 1.8–7.4)
NEUTROPHILS NFR BLD AUTO: 82 % — HIGH (ref 43–77)
NITRITE UR-MCNC: NEGATIVE — SIGNIFICANT CHANGE UP
NRBC # BLD: 0 /100 WBCS — SIGNIFICANT CHANGE UP (ref 0–0)
PH UR: 5.5 — SIGNIFICANT CHANGE UP (ref 5–8)
PLATELET # BLD AUTO: 151 K/UL — SIGNIFICANT CHANGE UP (ref 150–400)
POTASSIUM SERPL-MCNC: 4.2 MMOL/L — SIGNIFICANT CHANGE UP (ref 3.5–5.3)
POTASSIUM SERPL-SCNC: 4.2 MMOL/L — SIGNIFICANT CHANGE UP (ref 3.5–5.3)
PROT ?TM UR-MCNC: 32 MG/DL — HIGH (ref 0–12)
PROT SERPL-MCNC: 6 G/DL — SIGNIFICANT CHANGE UP (ref 6–8.3)
PROT UR-MCNC: ABNORMAL MG/DL
PROT/CREAT UR-RTO: 1.2 RATIO — HIGH (ref 0–0.2)
RBC # BLD: 4.23 M/UL — SIGNIFICANT CHANGE UP (ref 3.8–5.2)
RBC # FLD: 13 % — SIGNIFICANT CHANGE UP (ref 10.3–14.5)
RBC CASTS # UR COMP ASSIST: < 5 /HPF — SIGNIFICANT CHANGE UP
SODIUM SERPL-SCNC: 139 MMOL/L — SIGNIFICANT CHANGE UP (ref 135–145)
SODIUM UR-SCNC: 64 MMOL/L — SIGNIFICANT CHANGE UP
SP GR SPEC: 1.01 — SIGNIFICANT CHANGE UP (ref 1–1.03)
UROBILINOGEN FLD QL: 0.2 E.U./DL — SIGNIFICANT CHANGE UP
WBC # BLD: 11.7 K/UL — HIGH (ref 3.8–10.5)
WBC # FLD AUTO: 11.7 K/UL — HIGH (ref 3.8–10.5)
WBC UR QL: < 5 /HPF — SIGNIFICANT CHANGE UP

## 2021-05-11 PROCEDURE — 76770 US EXAM ABDO BACK WALL COMP: CPT | Mod: 26

## 2021-05-11 PROCEDURE — 99223 1ST HOSP IP/OBS HIGH 75: CPT

## 2021-05-11 RX ORDER — LABETALOL HCL 100 MG
200 TABLET ORAL EVERY 12 HOURS
Refills: 0 | Status: DISCONTINUED | OUTPATIENT
Start: 2021-05-11 | End: 2021-05-11

## 2021-05-11 RX ORDER — LABETALOL HCL 100 MG
200 TABLET ORAL EVERY 12 HOURS
Refills: 0 | Status: DISCONTINUED | OUTPATIENT
Start: 2021-05-11 | End: 2021-05-12

## 2021-05-11 RX ORDER — HEPARIN SODIUM 5000 [USP'U]/ML
5000 INJECTION INTRAVENOUS; SUBCUTANEOUS EVERY 12 HOURS
Refills: 0 | Status: DISCONTINUED | OUTPATIENT
Start: 2021-05-12 | End: 2021-05-13

## 2021-05-11 RX ORDER — NIFEDIPINE 30 MG
30 TABLET, EXTENDED RELEASE 24 HR ORAL EVERY 12 HOURS
Refills: 0 | Status: DISCONTINUED | OUTPATIENT
Start: 2021-05-11 | End: 2021-05-12

## 2021-05-11 RX ORDER — HYDRALAZINE HCL 50 MG
10 TABLET ORAL ONCE
Refills: 0 | Status: COMPLETED | OUTPATIENT
Start: 2021-05-11 | End: 2021-05-11

## 2021-05-11 RX ADMIN — Medication 975 MILLIGRAM(S): at 19:59

## 2021-05-11 RX ADMIN — Medication 975 MILLIGRAM(S): at 06:48

## 2021-05-11 RX ADMIN — Medication 975 MILLIGRAM(S): at 14:50

## 2021-05-11 RX ADMIN — Medication 30 MILLIGRAM(S): at 08:49

## 2021-05-11 RX ADMIN — Medication 975 MILLIGRAM(S): at 00:27

## 2021-05-11 RX ADMIN — Medication 30 MILLIGRAM(S): at 18:37

## 2021-05-11 RX ADMIN — Medication 975 MILLIGRAM(S): at 19:27

## 2021-05-11 RX ADMIN — Medication 975 MILLIGRAM(S): at 06:08

## 2021-05-11 RX ADMIN — Medication 200 MILLIGRAM(S): at 23:30

## 2021-05-11 RX ADMIN — ENOXAPARIN SODIUM 40 MILLIGRAM(S): 100 INJECTION SUBCUTANEOUS at 06:08

## 2021-05-11 RX ADMIN — Medication 975 MILLIGRAM(S): at 13:16

## 2021-05-11 RX ADMIN — Medication 1 TABLET(S): at 13:16

## 2021-05-11 RX ADMIN — Medication 10 MILLIGRAM(S): at 23:10

## 2021-05-11 NOTE — CONSULT NOTE ADULT - ATTENDING COMMENTS
36 yo woman seen for KAYLA in setting of PEC with severe features with KAYLA and elevated LFTs  non oliguric  does have significant leg edema, but she reports today is better than yesterday  was on NSAID  no evidence for hemolysis  creat being to plateau  BP still above goal at times as above increase nifedipine  as noted above

## 2021-05-11 NOTE — CONSULT NOTE ADULT - SUBJECTIVE AND OBJECTIVE BOX
HPI:  36 y/o s/p  for nonreassuring fetal heart rate on 36th week PPD # 3 developed PEC w/SF and KAYLA for which nephrology consulted  presented with Cr 0.7 PPD# 0 which was repeated on day 3 with spiking bps in 150-160 systolic, noted to have rising Cr 1.2 initially, started on Mg gtt though level robson to 9 with concern for toxicity mg gtt stopped Bp was relatively controlled with Nifedipine XL, she is making decent amount of Urine  this is her 2nd pregnancy, denies Hx of PEC for her 1st child, non contributory FHx   no  medical issues, only medications PNV    ROS: as noted above    Allerigies: NKDA  Medication: PNV  PMH/PSH:   FHx: noncontributory  Social Hx: no toxic habits     VITAL SIGNS:  T(C): 36.6 (21 @ 14:59), Max: 36.8 (05-10-21 @ 22:00)  T(F): 97.8 (21 @ 14:59), Max: 98.3 (21 @ 02:00)  HR: 108 (21 @ 14:59) (86 - 108)  BP: 144/100 (21 @ 14:59) (143/88 - 149/94)  RR: 16 (21 @ 14:59) (16 - 18)  SpO2: 99% (21 @ 14:59) (96% - 100%)    PHYSICAL EXAM:  Constitutional: sitting in the chair, NAD  Respiratory: CTA B/L  Cardiac: regular tachycardia, no MGR  Gastrointestinal: soft, post partum abdomen, mildly distended, NT  Extremities: warm, pedal edema present, improving per pt  Neurologic: AAOx3, nonfocal     .  LABS:                         12.4   11.70 )-----------( 151      ( 11 May 2021 06:23 )             38.4         139  |  106  |  18  ----------------------------<  111<H>  4.2   |  21<L>  |  1.40<H>    Ca    8.2<L>      11 May 2021 06:23  Mg     4.2         TPro  6.0  /  Alb  3.0<L>  /  TBili  0.3  /  DBili  x   /  AST  38  /  ALT  49<H>  /  AlkPhos  146<H>        Urinalysis Basic - ( 11 May 2021 13:56 )    Color: Yellow / Appearance: Clear / S.010 / pH: x  Gluc: x / Ketone: NEGATIVE  / Bili: Negative / Urobili: 0.2 E.U./dL   Blood: x / Protein: Trace mg/dL / Nitrite: NEGATIVE   Leuk Esterase: NEGATIVE / RBC: < 5 /HPF / WBC < 5 /HPF   Sq Epi: x / Non Sq Epi: 5-10 /HPF / Bacteria: Present /HPF  RADIOLOGY, EKG & ADDITIONAL TESTS: Reviewed.

## 2021-05-11 NOTE — PROGRESS NOTE ADULT - ASSESSMENT
A/P: 34yo POD#5 s/p LTCS now meeting criteria for PEC w/SF (BP)  - PEC w/SF: s/p 10mg IR nifedipine. s/p IV Mg, started on nifedipine 30mg qd. Continue to monitor VS BP mild range o/n  - KAYLA: Cr elevated to 1.22, f/u full labs this AM   - Continue motrin, tylenol, oxycodone PRN for pain control.  - Increase ambulation; lovenox   - Continue regular diet  - Discharge planning

## 2021-05-11 NOTE — CHART NOTE - NSCHARTNOTEFT_GEN_A_CORE
Patient assessed at bedside for severe range BP x2. IV hydralazine 2 pushed @2310. Patient asymptomatic - She denies headache, dizziness, visual disturbances, nausea/vomiting, RUQ pain, epigastric pain, and SOB. Will also start patient on Labetalol 200 BID now. Discussed with Dr. Lucas.    Ariana Schofield MD PGY1  LOUIE Salas MD PGY3  LOUIE Lucas DO Patient assessed at bedside for severe range BP x2. IV hydralazine 2 pushed @2310. Patient asymptomatic - She denies headache, dizziness, visual disturbances, nausea/vomiting, RUQ pain, epigastric pain, and SOB. Will also start patient on Labetalol 200 BID now. Discussed with Dr. Lucas. Repeat BP mild range. Continue to monitor.    Ariana Schofield MD PGY1  LOUIE Salas MD PGY3  LOUIE Lucas DO

## 2021-05-11 NOTE — CONSULT NOTE ADULT - PROBLEM SELECTOR RECOMMENDATION 9
PEC w/SF and KAYLA  Non oliguric perfusional KAYLA with a component of intrinsic injury in the setting of NSAID use  US unremarkable for obstructive processes   Cr plateauing/ lytes wnl/ autodiuresing well and mobilizing edema --> monitor renal function for now please stop all NSAIDs  PEC labs reviewed, there is no evidence of hemolysis thrombocytopenia and transaminitis improving    for optimization of bp control please increase Nifedipine to 30 mg XL BID   Will follow

## 2021-05-12 LAB
ALBUMIN SERPL ELPH-MCNC: 3.6 G/DL — SIGNIFICANT CHANGE UP (ref 3.3–5)
ALP SERPL-CCNC: 160 U/L — HIGH (ref 40–120)
ALT FLD-CCNC: 42 U/L — SIGNIFICANT CHANGE UP (ref 10–45)
ANION GAP SERPL CALC-SCNC: 13 MMOL/L — SIGNIFICANT CHANGE UP (ref 5–17)
AST SERPL-CCNC: 21 U/L — SIGNIFICANT CHANGE UP (ref 10–40)
BASOPHILS # BLD AUTO: 0.02 K/UL — SIGNIFICANT CHANGE UP (ref 0–0.2)
BASOPHILS NFR BLD AUTO: 0.2 % — SIGNIFICANT CHANGE UP (ref 0–2)
BILIRUB SERPL-MCNC: 0.3 MG/DL — SIGNIFICANT CHANGE UP (ref 0.2–1.2)
BUN SERPL-MCNC: 24 MG/DL — HIGH (ref 7–23)
CALCIUM SERPL-MCNC: 8.7 MG/DL — SIGNIFICANT CHANGE UP (ref 8.4–10.5)
CHLORIDE SERPL-SCNC: 106 MMOL/L — SIGNIFICANT CHANGE UP (ref 96–108)
CO2 SERPL-SCNC: 21 MMOL/L — LOW (ref 22–31)
CREAT SERPL-MCNC: 1.61 MG/DL — HIGH (ref 0.5–1.3)
EOSINOPHIL # BLD AUTO: 0.04 K/UL — SIGNIFICANT CHANGE UP (ref 0–0.5)
EOSINOPHIL NFR BLD AUTO: 0.3 % — SIGNIFICANT CHANGE UP (ref 0–6)
GLUCOSE SERPL-MCNC: 117 MG/DL — HIGH (ref 70–99)
HCT VFR BLD CALC: 40.3 % — SIGNIFICANT CHANGE UP (ref 34.5–45)
HGB BLD-MCNC: 13.4 G/DL — SIGNIFICANT CHANGE UP (ref 11.5–15.5)
IMM GRANULOCYTES NFR BLD AUTO: 0.8 % — SIGNIFICANT CHANGE UP (ref 0–1.5)
LYMPHOCYTES # BLD AUTO: 1.37 K/UL — SIGNIFICANT CHANGE UP (ref 1–3.3)
LYMPHOCYTES # BLD AUTO: 11 % — LOW (ref 13–44)
MAGNESIUM SERPL-MCNC: 3.3 MG/DL — HIGH (ref 1.6–2.6)
MCHC RBC-ENTMCNC: 30 PG — SIGNIFICANT CHANGE UP (ref 27–34)
MCHC RBC-ENTMCNC: 33.3 GM/DL — SIGNIFICANT CHANGE UP (ref 32–36)
MCV RBC AUTO: 90.2 FL — SIGNIFICANT CHANGE UP (ref 80–100)
MONOCYTES # BLD AUTO: 0.87 K/UL — SIGNIFICANT CHANGE UP (ref 0–0.9)
MONOCYTES NFR BLD AUTO: 7 % — SIGNIFICANT CHANGE UP (ref 2–14)
NEUTROPHILS # BLD AUTO: 10.07 K/UL — HIGH (ref 1.8–7.4)
NEUTROPHILS NFR BLD AUTO: 80.7 % — HIGH (ref 43–77)
NRBC # BLD: 0 /100 WBCS — SIGNIFICANT CHANGE UP (ref 0–0)
PHOSPHATE SERPL-MCNC: 4.6 MG/DL — HIGH (ref 2.5–4.5)
PLATELET # BLD AUTO: 165 K/UL — SIGNIFICANT CHANGE UP (ref 150–400)
POTASSIUM SERPL-MCNC: 4 MMOL/L — SIGNIFICANT CHANGE UP (ref 3.5–5.3)
POTASSIUM SERPL-SCNC: 4 MMOL/L — SIGNIFICANT CHANGE UP (ref 3.5–5.3)
PROT SERPL-MCNC: 6.4 G/DL — SIGNIFICANT CHANGE UP (ref 6–8.3)
RBC # BLD: 4.47 M/UL — SIGNIFICANT CHANGE UP (ref 3.8–5.2)
RBC # FLD: 13 % — SIGNIFICANT CHANGE UP (ref 10.3–14.5)
SODIUM SERPL-SCNC: 140 MMOL/L — SIGNIFICANT CHANGE UP (ref 135–145)
WBC # BLD: 12.47 K/UL — HIGH (ref 3.8–10.5)
WBC # FLD AUTO: 12.47 K/UL — HIGH (ref 3.8–10.5)

## 2021-05-12 PROCEDURE — 99233 SBSQ HOSP IP/OBS HIGH 50: CPT

## 2021-05-12 RX ORDER — NIFEDIPINE 30 MG
60 TABLET, EXTENDED RELEASE 24 HR ORAL EVERY 12 HOURS
Refills: 0 | Status: DISCONTINUED | OUTPATIENT
Start: 2021-05-12 | End: 2021-05-12

## 2021-05-12 RX ORDER — NIFEDIPINE 30 MG
60 TABLET, EXTENDED RELEASE 24 HR ORAL EVERY 12 HOURS
Refills: 0 | Status: DISCONTINUED | OUTPATIENT
Start: 2021-05-12 | End: 2021-05-13

## 2021-05-12 RX ORDER — NIFEDIPINE 30 MG
30 TABLET, EXTENDED RELEASE 24 HR ORAL ONCE
Refills: 0 | Status: COMPLETED | OUTPATIENT
Start: 2021-05-12 | End: 2021-05-12

## 2021-05-12 RX ORDER — LABETALOL HCL 100 MG
200 TABLET ORAL EVERY 12 HOURS
Refills: 0 | Status: DISCONTINUED | OUTPATIENT
Start: 2021-05-12 | End: 2021-05-13

## 2021-05-12 RX ADMIN — Medication 60 MILLIGRAM(S): at 18:01

## 2021-05-12 RX ADMIN — HEPARIN SODIUM 5000 UNIT(S): 5000 INJECTION INTRAVENOUS; SUBCUTANEOUS at 17:54

## 2021-05-12 RX ADMIN — Medication 975 MILLIGRAM(S): at 06:20

## 2021-05-12 RX ADMIN — Medication 975 MILLIGRAM(S): at 18:00

## 2021-05-12 RX ADMIN — Medication 975 MILLIGRAM(S): at 12:55

## 2021-05-12 RX ADMIN — Medication 975 MILLIGRAM(S): at 14:00

## 2021-05-12 RX ADMIN — Medication 60 MILLIGRAM(S): at 06:12

## 2021-05-12 RX ADMIN — Medication 200 MILLIGRAM(S): at 23:03

## 2021-05-12 RX ADMIN — Medication 1 TABLET(S): at 12:55

## 2021-05-12 RX ADMIN — Medication 30 MILLIGRAM(S): at 03:04

## 2021-05-12 RX ADMIN — Medication 200 MILLIGRAM(S): at 10:30

## 2021-05-12 RX ADMIN — Medication 975 MILLIGRAM(S): at 23:04

## 2021-05-12 RX ADMIN — HEPARIN SODIUM 5000 UNIT(S): 5000 INJECTION INTRAVENOUS; SUBCUTANEOUS at 06:12

## 2021-05-12 RX ADMIN — Medication 975 MILLIGRAM(S): at 07:06

## 2021-05-12 NOTE — PROGRESS NOTE ADULT - PROBLEM SELECTOR PLAN 1
PEC w/SF and KAYLA  Non oliguric intrinsic preeclampsia related KAYLA  Cr still uptrending though lytes wnl/ autodiuresing well and mobilizing edema  PEC labs reviewed, there is no evidence of hemolysis, thrombocytopenia and transaminitis improving    For Bp control:   please continue with Nifedipine 60 mg BID and labetalol 200 mg BID   Since morning Bp has been at goal would further adjust antihypertensives based on following bp readings  Monitor renal function for now no urgent intervention required   Will follow

## 2021-05-12 NOTE — PROVIDER CONTACT NOTE (OTHER) - BACKGROUND
V/S was monitored every 4h, BP was not improving.
PO1,, 36.1, no medical hx.
PO6. S/P Magnesium Sulfate therapy D/C on 5/10/2021.

## 2021-05-12 NOTE — PROGRESS NOTE ADULT - ATTENDING COMMENTS
Patient seen and examined at bedside. Patient has no current complaints. Patient denies any toxic complaints. Nephro following appreciate recommendations. Continue to monitor BP.
Patient seen and examined at bedside. Patient has no current toxic complaints. Feels slightly dizzy post additional BP medication. Will continue to monitor, appreciate nephro recs, f/u am labs
36 yo woman seen for KAYLA in setting of PEC with severe features with KAYLA and elevated LFTs  overnight events noted- acute rise in BP- now on nifedipine 60 mg q12H  and labetalol 200 mg q12H  leg edema improving  creat up to 1.6-  u/a without blood- minimal protein- non oliguric no evidence of TTP/HUS/ hemolysis  to continue to monitor closely  reviewed with OB team

## 2021-05-12 NOTE — PROVIDER CONTACT NOTE (OTHER) - ACTION/TREATMENT ORDERED:
MD ordered to continue V/S every 4 h. MD will follow up.
MD ordered to draw labs, give Nifedipine 10mg po and start Magnesium sulfate. Mag sulfate started @ 07:26. Pt was transferred in [facility] [place] @ 08:00 am.
Hold Labetalol 200 mg PO dose, reassess VS in one hour, and report HR & BP to Dr. Mona Salas.
Labs to be drawn.

## 2021-05-12 NOTE — PROGRESS NOTE ADULT - ASSESSMENT
A/P: 34yo POD#6 s/p LTCS now meeting criteria for PEC w/SF (BP)  - PEC w/SF: s/p 10mg IR nifedipine. s/p IV Mg, started on nifedipine 30mg qd. Continue to monitor VS BP mild range o/n  - KAYLA: Cr elevated to 1.22, f/u full labs this AM   - Continue motrin, tylenol, oxycodone PRN for pain control.  - Increase ambulation; lovenox   - Continue regular diet  - Discharge planning A/P: 34yo POD#6 s/p LTCS now meeting criteria for PEC w/SF (BP)  - PEC w/SF: s/p 10mg IR nifedipine. s/p IV Mg. O/N severe range blood pressures  s/p hydralazine 10 @ 23:10, labetalol 200mg BID was added. s/p stat dose of nifedipine 30 at 2:00AM, now on 60mg BID.   - KAYLA: Cr elevated to 1.40 f/u full labs this AM   - Continue motrin, tylenol, oxycodone PRN for pain control.  - Increase ambulation; SQH  - Continue regular diet  - Discharge planning

## 2021-05-12 NOTE — PROVIDER CONTACT NOTE (OTHER) - ASSESSMENT
Vital Signs  T 98.5    /78  RR 18  O2 sat 97%  Patient denies feeling palpitations, headache, blurry vision, SOB, chest/epigastric/RUQ pain.  Lung sounds clear bilaterally.

## 2021-05-12 NOTE — PROVIDER CONTACT NOTE (OTHER) - SITUATION
Elevated BP
Pt had elevated /90, P62 Oxygen sat 99% T97.8
Patient had an elevated AM BP.
Patient's routine vital signs checked at 22:00. HR elevated, BP borderline low and is due for Labetalol 200 mg PO.

## 2021-05-13 ENCOUNTER — TRANSCRIPTION ENCOUNTER (OUTPATIENT)
Age: 36
End: 2021-05-13

## 2021-05-13 VITALS
DIASTOLIC BLOOD PRESSURE: 96 MMHG | OXYGEN SATURATION: 97 % | RESPIRATION RATE: 18 BRPM | TEMPERATURE: 97 F | HEART RATE: 105 BPM | SYSTOLIC BLOOD PRESSURE: 135 MMHG

## 2021-05-13 LAB
ALBUMIN SERPL ELPH-MCNC: 3.5 G/DL — SIGNIFICANT CHANGE UP (ref 3.3–5)
ALP SERPL-CCNC: 151 U/L — HIGH (ref 40–120)
ALT FLD-CCNC: 34 U/L — SIGNIFICANT CHANGE UP (ref 10–45)
ANION GAP SERPL CALC-SCNC: 14 MMOL/L — SIGNIFICANT CHANGE UP (ref 5–17)
AST SERPL-CCNC: 14 U/L — SIGNIFICANT CHANGE UP (ref 10–40)
BILIRUB SERPL-MCNC: 0.3 MG/DL — SIGNIFICANT CHANGE UP (ref 0.2–1.2)
BUN SERPL-MCNC: 18 MG/DL — SIGNIFICANT CHANGE UP (ref 7–23)
CALCIUM SERPL-MCNC: 9.2 MG/DL — SIGNIFICANT CHANGE UP (ref 8.4–10.5)
CHLORIDE SERPL-SCNC: 105 MMOL/L — SIGNIFICANT CHANGE UP (ref 96–108)
CO2 SERPL-SCNC: 21 MMOL/L — LOW (ref 22–31)
CREAT SERPL-MCNC: 1.34 MG/DL — HIGH (ref 0.5–1.3)
GLUCOSE SERPL-MCNC: 92 MG/DL — SIGNIFICANT CHANGE UP (ref 70–99)
HCT VFR BLD CALC: 41.7 % — SIGNIFICANT CHANGE UP (ref 34.5–45)
HGB BLD-MCNC: 13.9 G/DL — SIGNIFICANT CHANGE UP (ref 11.5–15.5)
MAGNESIUM SERPL-MCNC: 2.9 MG/DL — HIGH (ref 1.6–2.6)
MCHC RBC-ENTMCNC: 29.8 PG — SIGNIFICANT CHANGE UP (ref 27–34)
MCHC RBC-ENTMCNC: 33.3 GM/DL — SIGNIFICANT CHANGE UP (ref 32–36)
MCV RBC AUTO: 89.5 FL — SIGNIFICANT CHANGE UP (ref 80–100)
NRBC # BLD: 0 /100 WBCS — SIGNIFICANT CHANGE UP (ref 0–0)
PHOSPHATE SERPL-MCNC: 4.6 MG/DL — HIGH (ref 2.5–4.5)
PLATELET # BLD AUTO: 203 K/UL — SIGNIFICANT CHANGE UP (ref 150–400)
POTASSIUM SERPL-MCNC: 3.8 MMOL/L — SIGNIFICANT CHANGE UP (ref 3.5–5.3)
POTASSIUM SERPL-SCNC: 3.8 MMOL/L — SIGNIFICANT CHANGE UP (ref 3.5–5.3)
PROT SERPL-MCNC: 6.7 G/DL — SIGNIFICANT CHANGE UP (ref 6–8.3)
RBC # BLD: 4.66 M/UL — SIGNIFICANT CHANGE UP (ref 3.8–5.2)
RBC # FLD: 12.9 % — SIGNIFICANT CHANGE UP (ref 10.3–14.5)
SODIUM SERPL-SCNC: 140 MMOL/L — SIGNIFICANT CHANGE UP (ref 135–145)
WBC # BLD: 13.14 K/UL — HIGH (ref 3.8–10.5)
WBC # FLD AUTO: 13.14 K/UL — HIGH (ref 3.8–10.5)

## 2021-05-13 PROCEDURE — 86901 BLOOD TYPING SEROLOGIC RH(D): CPT

## 2021-05-13 PROCEDURE — 85730 THROMBOPLASTIN TIME PARTIAL: CPT

## 2021-05-13 PROCEDURE — 85384 FIBRINOGEN ACTIVITY: CPT

## 2021-05-13 PROCEDURE — 82570 ASSAY OF URINE CREATININE: CPT

## 2021-05-13 PROCEDURE — 84300 ASSAY OF URINE SODIUM: CPT

## 2021-05-13 PROCEDURE — 36415 COLL VENOUS BLD VENIPUNCTURE: CPT

## 2021-05-13 PROCEDURE — 88307 TISSUE EXAM BY PATHOLOGIST: CPT

## 2021-05-13 PROCEDURE — 86769 SARS-COV-2 COVID-19 ANTIBODY: CPT

## 2021-05-13 PROCEDURE — 99214 OFFICE O/P EST MOD 30 MIN: CPT

## 2021-05-13 PROCEDURE — 81001 URINALYSIS AUTO W/SCOPE: CPT

## 2021-05-13 PROCEDURE — 84100 ASSAY OF PHOSPHORUS: CPT

## 2021-05-13 PROCEDURE — 82962 GLUCOSE BLOOD TEST: CPT

## 2021-05-13 PROCEDURE — 87635 SARS-COV-2 COVID-19 AMP PRB: CPT

## 2021-05-13 PROCEDURE — 84550 ASSAY OF BLOOD/URIC ACID: CPT

## 2021-05-13 PROCEDURE — 80053 COMPREHEN METABOLIC PANEL: CPT

## 2021-05-13 PROCEDURE — 86850 RBC ANTIBODY SCREEN: CPT

## 2021-05-13 PROCEDURE — 85025 COMPLETE CBC W/AUTO DIFF WBC: CPT

## 2021-05-13 PROCEDURE — 83735 ASSAY OF MAGNESIUM: CPT

## 2021-05-13 PROCEDURE — 82565 ASSAY OF CREATININE: CPT

## 2021-05-13 PROCEDURE — 84156 ASSAY OF PROTEIN URINE: CPT

## 2021-05-13 PROCEDURE — 85610 PROTHROMBIN TIME: CPT

## 2021-05-13 PROCEDURE — 85027 COMPLETE CBC AUTOMATED: CPT

## 2021-05-13 PROCEDURE — 86780 TREPONEMA PALLIDUM: CPT

## 2021-05-13 PROCEDURE — 86900 BLOOD TYPING SEROLOGIC ABO: CPT

## 2021-05-13 PROCEDURE — 83615 LACTATE (LD) (LDH) ENZYME: CPT

## 2021-05-13 PROCEDURE — 76770 US EXAM ABDO BACK WALL COMP: CPT

## 2021-05-13 RX ORDER — LABETALOL HCL 100 MG
1 TABLET ORAL
Qty: 60 | Refills: 0
Start: 2021-05-13 | End: 2021-06-11

## 2021-05-13 RX ORDER — ACETAMINOPHEN 500 MG
3 TABLET ORAL
Qty: 0 | Refills: 0 | DISCHARGE
Start: 2021-05-13

## 2021-05-13 RX ORDER — LABETALOL HCL 100 MG
1 TABLET ORAL
Qty: 0 | Refills: 0 | DISCHARGE
Start: 2021-05-13

## 2021-05-13 RX ORDER — NIFEDIPINE 30 MG
1 TABLET, EXTENDED RELEASE 24 HR ORAL
Qty: 60 | Refills: 0
Start: 2021-05-13 | End: 2021-06-11

## 2021-05-13 RX ORDER — NIFEDIPINE 30 MG
1 TABLET, EXTENDED RELEASE 24 HR ORAL
Qty: 0 | Refills: 0 | DISCHARGE
Start: 2021-05-13

## 2021-05-13 RX ADMIN — HEPARIN SODIUM 5000 UNIT(S): 5000 INJECTION INTRAVENOUS; SUBCUTANEOUS at 06:02

## 2021-05-13 RX ADMIN — Medication 975 MILLIGRAM(S): at 00:00

## 2021-05-13 RX ADMIN — Medication 60 MILLIGRAM(S): at 06:02

## 2021-05-13 RX ADMIN — Medication 200 MILLIGRAM(S): at 09:56

## 2021-05-13 NOTE — DISCHARGE NOTE OB - PATIENT PORTAL LINK FT
You can access the FollowMyHealth Patient Portal offered by [facility] by registering at the following website: [email or web address]. By joining [facility]’s FollowMyHealth portal, you will also be able to view your health information using other applications (apps) compatible with our system.

## 2021-05-13 NOTE — DISCHARGE NOTE OB - MEDICATION SUMMARY - MEDICATIONS TO TAKE
I will START or STAY ON the medications listed below when I get home from the hospital:    acetaminophen 325 mg oral tablet  -- 3 tab(s) by mouth   -- Indication: For Pain    labetalol 200 mg oral tablet  -- 1 tab(s) by mouth every 12 hours  -- Indication: For Preeclampsia    NIFEdipine 60 mg oral tablet, extended release  -- 1 tab(s) by mouth every 12 hours  -- Indication: For Preeclampsia    Prenatal Multivitamins with Folic Acid 1 mg oral tablet  -- 1 tab(s) by mouth once a day  -- Indication: For Postpartum state   I will START or STAY ON the medications listed below when I get home from the hospital:    acetaminophen 325 mg oral tablet  -- 3 tab(s) by mouth   -- Indication: For Pain    labetalol 200 mg oral tablet  -- 1 tab(s) by mouth every 12 hours  -- Indication: For Pre-eclampsia    NIFEdipine 60 mg oral tablet, extended release  -- 1 tab(s) by mouth every 12 hours  -- Indication: For Pre-eclampsia    Prenatal Multivitamins with Folic Acid 1 mg oral tablet  -- 1 tab(s) by mouth once a day  -- Indication: For Postpartum

## 2021-05-13 NOTE — DISCHARGE NOTE OB - CARE PROVIDERS DIRECT ADDRESSES
,DirectAddress_Unknown,[email or web address]

## 2021-05-13 NOTE — PROGRESS NOTE ADULT - PROBLEM SELECTOR PLAN 1
PEC w/SF and KAYLA  Non oliguric intrinsic preeclampsia related KAYLA/ improving with good UOP, improving edema and normal lytes   Bp well controlled and at goal on current antihypertensive regimen  Nephrologically improving and Ok for discharge  Bp regimen explaned to pt and   Will continue Nifedipine 60 mg XL BID with [id number] mg BID, spaced out  will monitor bp before taking medication if SBp< 110 mmhg will skin the labetalol  if notices sBp persistently above 140s will contact OB or nephrology clinic  Plan for tele health follow up visit with Dr. Lynch next week PEC w/SF and KAYLA  Non oliguric intrinsic preeclampsia related KAYLA/ improving with good UOP, improving edema and normal lytes   Bp well controlled and at goal on current antihypertensive regimen  Nephrologically improving and Ok for discharge  Bp regimen explaned to pt and   Will continue Nifedipine 60 mg XL BID with [id number] mg BID, spaced out  will monitor bp before taking medication if SBp< 110 mmhg will skip the labetalol  if notices sBp persistently above 140s will contact OB or nephrology clinic  Plan for tele health follow up visit with Dr. Lynch next week

## 2021-05-13 NOTE — DISCHARGE NOTE OB - CARE PLAN
Principal Discharge DX:	Postpartum state  Goal:	Happy and healthy mom and baby!  Assessment and plan of treatment:	Please follow-up with your OB doctor within 1-2 weeks for an incision check & BP check. You can resume a regular diet at home and you should continue your prenatal vitamins as directed. You can take Tylenol 650mg by mouth every 6 hours for pain as needed. Try to avoid Motrin if possible.    Please place nothing in the vagina for 6 weeks (no tampons, no sex, no douching, no baths, no hot tubs, no swimming pools, etc). If you have severe headaches and/or vision changes, heavy bleeding, chest pain, or shortness of breath, please call your provider or go to the nearest ED. Call your OB with any signs of symptoms of infection including fever > 100.4 degrees, severe pain, malodorous vaginal discharge or heavy bleeding requiring more than 1-2 pads/hour.  Secondary Diagnosis:	Preeclampsia, severe, third trimester  Goal:	Good BP control  Assessment and plan of treatment:	# Monitor blood pressure three times a day. Please document the blood pressure values to review with obstetrician at follow-up appointment.   # If your blood pressure is equal to or greater than 160/110 (either one) OR if you experience any of the following symptoms: changes in vision, headache not relieved with Tylenol, severe abdominal pain, vomiting, increased vaginal bleeding, chest pain or shortness of breath, please return to the hospital.  # If your blood pressure is equal to or greater than 150/100 (either one), please call your obstetrician.  # Hold parameters: If the blood pressure is lower than 110/60 (either one) skip that dose of medication and inform your doctor.  # Please schedule an appointment at your physician office in one week.  Secondary Diagnosis:	Acute kidney injury  Goal:	Resolving! Please follow-up with your OB for a blood draw in 1-2weeks.  Assessment and plan of treatment:	please call Dr. Florian's (the nephrologist) office for a telehealth visit in 1 week. She requests that you have a blood draw at that time as well to follow up on your labs which were elevated during your postpartum period.

## 2021-05-13 NOTE — DISCHARGE NOTE OB - HOSPITAL COURSE
Patient had uncomplicated low transverse  section.  Please see operative note for details.  Postpartum course c/b PEC w/SF (for severe range BP's requirng multiple IVP, and elevated Cr to a max of 1.61) for which she received 24 hrs of IV Mg for seizure prophylaxis. She was also started on nifedipine 60mg BID and lebetalol 200mg BID for BP control. Postpartum course also c/b intrinsic KAYLA (likely 2/2 PEC) with a Cr elevated to a max of 1.61 for which nephrology was following. Cr downtrending on AM of discharge (Cr of 1.34). During postpartum course patient's vitals were stable, vaginal bleeding appropriate, and pain well controlled.  Post operation day one hematocrit was appropriate.  On day of discharge patient was ambulating, her pain controlled with oral medications, had adequate oral intake, and was voiding freely.  Discharge instructions and precautions were given.  Will return to clinic in 1 week for incision check and BP check. Patient to also f/u with Dr. Florian from nephrology in 1 week for telehealth visit and repeat CMP to trend her Cr.

## 2021-05-13 NOTE — PROGRESS NOTE ADULT - ASSESSMENT
A/P: 36yo POD#7 s/p LTCS with postpartum PEC w/SF (BP, Cr)  - PEC w/SF:  s/p IV Mg. On labetalol 200mg BID and Nifedipine 60mg BID.   - KAYLA: Cr trending down to 1.34, f/u full labs this AM   - Appreciate nephrology recs.  - Continue motrin, tylenol, oxycodone PRN for pain control.  - Increase ambulation; SQH  - Continue regular diet  - Discharge planning

## 2021-05-13 NOTE — PROGRESS NOTE ADULT - SUBJECTIVE AND OBJECTIVE BOX
O/N Events/Subjective:  hypertensive, required 10 mg Hudral push, nifedipine dose increased to 60 mg BID and started on 200 mg BID of labetalol  denies any symptom with high Bps, no scotomas no RUQ pain, labs stable except for uptrending Cr to 1.6/ transaminitis and thrombocytopenia improved     VITALS  Vital Signs Last 24 Hrs  T(C): 36.6 (11 May 2021 14:59), Max: 36.6 (11 May 2021 14:59)  T(F): 97.8 (11 May 2021 14:59), Max: 97.8 (11 May 2021 14:59)  HR: 114 (12 May 2021 10:00) (68 - 114)  BP: 124/85 (12 May 2021 10:00) (124/85 - 169/91)  RR: 18 (12 May 2021 10:00) (16 - 18)  SpO2: 98% (12 May 2021 01:30) (98% - 99%)    PHYSICAL EXAM  General: A&Ox 3; NAD  Respiratory: CTA B/L  Cardiovascular: Regular tachycardia   Gastrointestinal: Soft; NTND   Extremities: WWP; pedal edema present though improving     MEDICATIONS  (STANDING):  acetaminophen   Tablet .. 975 milliGRAM(s) Oral <User Schedule>  diphtheria/tetanus/pertussis (acellular) Vaccine (ADAcel) 0.5 milliLiter(s) IntraMuscular once  heparin   Injectable 5000 Unit(s) SubCutaneous every 12 hours  labetalol 200 milliGRAM(s) Oral every 12 hours  magnesium sulfate Infusion 2 Gm/Hr (50 mL/Hr) IV Continuous <Continuous>  NIFEdipine XL 60 milliGRAM(s) Oral every 12 hours  oxytocin Infusion 333.333 milliUNIT(s)/Min (1000 mL/Hr) IV Continuous <Continuous>  prenatal multivitamin 1 Tablet(s) Oral daily    MEDICATIONS  (PRN):  diphenhydrAMINE 25 milliGRAM(s) Oral every 6 hours PRN Pruritus  lanolin Ointment 1 Application(s) Topical every 6 hours PRN Sore Nipples  magnesium hydroxide Suspension 30 milliLiter(s) Oral two times a day PRN Constipation  oxyCODONE    IR 5 milliGRAM(s) Oral every 3 hours PRN Moderate to Severe Pain (4-10)  oxyCODONE    IR 5 milliGRAM(s) Oral once PRN Moderate to Severe Pain (4-10)  simethicone 80 milliGRAM(s) Chew every 4 hours PRN Gas      LABS                        13.4   12.47 )-----------( 165      ( 12 May 2021 07:28 )             40.3     -12    140  |  106  |  24<H>  ----------------------------<  117<H>  4.0   |  21<L>  |  1.61<H>    Ca    8.7      12 May 2021 07:28  Phos  4.6     05-12  Mg     3.3         TPro  6.4  /  Alb  3.6  /  TBili  0.3  /  DBili  x   /  AST  21  /  ALT  42  /  AlkPhos  160<H>  12    LIVER FUNCTIONS - ( 12 May 2021 07:28 )  Alb: 3.6 g/dL / Pro: 6.4 g/dL / ALK PHOS: 160 U/L / ALT: 42 U/L / AST: 21 U/L / GGT: x             Urinalysis Basic - ( 11 May 2021 13:56 )    Color: Yellow / Appearance: Clear / S.010 / pH: x  Gluc: x / Ketone: NEGATIVE  / Bili: Negative / Urobili: 0.2 E.U./dL   Blood: x / Protein: Trace mg/dL / Nitrite: NEGATIVE   Leuk Esterase: NEGATIVE / RBC: < 5 /HPF / WBC < 5 /HPF   Sq Epi: x / Non Sq Epi: 5-10 /HPF / Bacteria: Present /HPF
OB Postpartum Note:  Delivery, POD#3    S: 36yo POD#3 s/p LTCS. The patient feels well.  Pain is well controlled. She is tolerating a regular diet and passing flatus. She is voiding spontaneously, and ambulating without difficulty. Denies CP/SOB. Denies lightheadedness/dizziness. Denies N/V, heavy vaginal bleeding.     O:  Vitals:  Vital Signs Last 24 Hrs  T(C): 36.4 (09 May 2021 06:10), Max: 36.8 (08 May 2021 09:30)  T(F): 97.5 (09 May 2021 06:10), Max: 98.3 (08 May 2021 09:30)  HR: 80 (09 May 2021 06:10) (78 - 84)  BP: 132/86 (09 May 2021 06:10) (115/81 - 132/86)  BP(mean): --  RR: 18 (09 May 2021 06:10) (16 - 18)  SpO2: 97% (09 May 2021 06:10) (97% - 98%)    MEDICATIONS  (STANDING):  acetaminophen   Tablet .. 975 milliGRAM(s) Oral <User Schedule>  diphtheria/tetanus/pertussis (acellular) Vaccine (ADAcel) 0.5 milliLiter(s) IntraMuscular once  enoxaparin Injectable 40 milliGRAM(s) SubCutaneous every 24 hours  ibuprofen  Tablet. 600 milliGRAM(s) Oral every 6 hours  lactated ringers. 1000 milliLiter(s) (125 mL/Hr) IV Continuous <Continuous>  oxytocin Infusion 333.333 milliUNIT(s)/Min (1000 mL/Hr) IV Continuous <Continuous>  prenatal multivitamin 1 Tablet(s) Oral daily    MEDICATIONS  (PRN):  diphenhydrAMINE 25 milliGRAM(s) Oral every 6 hours PRN Pruritus  lanolin Ointment 1 Application(s) Topical every 6 hours PRN Sore Nipples  magnesium hydroxide Suspension 30 milliLiter(s) Oral two times a day PRN Constipation  oxyCODONE    IR 5 milliGRAM(s) Oral every 3 hours PRN Moderate to Severe Pain (4-10)  oxyCODONE    IR 5 milliGRAM(s) Oral once PRN Moderate to Severe Pain (4-10)  simethicone 80 milliGRAM(s) Chew every 4 hours PRN Gas      LABS:  Blood type: A Positive  Rubella IgG: RPR: Negative                          12.0   13.83<H> >-----------< 111<L>    (  @ 05:52 )             35.9                        14.9   10.37 >-----------< 134<L>    (  @ 12:19 )             43.6    21 @ 06:48      135  |  104  |  12  ----------------------------<  85  4.6   |  20<L>  |  0.78        Ca    9.4      07 May 2021 06:48    TPro  5.9<L>  /  Alb  3.3  /  TBili  0.2  /  DBili  x   /  AST  30  /  ALT  23  /  AlkPhos  116  21 @ 06:48          Physical exam:  Gen: NAD  Abdomen: Soft, nontender, no distension , firm uterine fundus at umbilicus.  Incision: Clean, dry, and intact   Pelvic: Normal lochia noted  Ext: No calf tenderness          
Patient evaluated at bedside this morning, resting comfortable in bed.   She reports pain is well controlled.  She denies headache, dizziness, chest pain, palpitations, shortness of breathe, nausea, vomiting or heavy vaginal bleeding.  She has been ambulating without assistance, voiding spontaneously, passing gas, tolerating regular diet and is breastfeeding.    Physical Exam:  Vital Signs Last 24 Hrs  T(C): 36.3 (08 May 2021 05:45), Max: 36.9 (07 May 2021 10:06)  T(F): 97.3 (08 May 2021 05:45), Max: 98.4 (07 May 2021 10:06)  HR: 71 (08 May 2021 05:45) (71 - 84)  BP: 130/89 (08 May 2021 05:45) (114/80 - 130/89)  BP(mean): --  RR: 18 (08 May 2021 05:45) (16 - 18)  SpO2: 97% (08 May 2021 05:45) (97% - 98%)    GA: NAD, A+0 x 3  Abd: + BS, soft, nontender, nondistended, no rebound or guarding, incision clean, dry and intact, uterus firm at midline and below umbilicus  : lochia WNL  Extremities: no swelling or calf tenderness                             12.0   13.83 )-----------( 111      ( 07 May 2021 05:52 )             35.9     05-07    135  |  104  |  12  ----------------------------<  85  4.6   |  20<L>  |  0.78    Ca    9.4      07 May 2021 06:48    TPro  5.9<L>  /  Alb  3.3  /  TBili  0.2  /  DBili  x   /  AST  30  /  ALT  23  /  AlkPhos  116  05-07      PT/INR - ( 07 May 2021 06:48 )   PT: 11.3 sec;   INR: 0.94          PTT - ( 07 May 2021 06:48 )  PTT:31.5 sec  
O/N Events/Subjective:  blood pressure well controlled and at goal on current antihypertensives   Cr downtrending, with good UOP and improving edema     VITALS  Vital Signs Last 24 Hrs  T(C): 36.2 (13 May 2021 10:04), Max: 36.9 (12 May 2021 22:00)  T(F): 97.1 (13 May 2021 10:04), Max: 98.5 (12 May 2021 22:00)  HR: 105 (13 May 2021 10:04) (102 - 114)  BP: 135/96 (13 May 2021 10:04) (115/76 - 142/92)  RR: 18 (13 May 2021 10:04) (16 - 18)  SpO2: 97% (13 May 2021 10:04) (96% - 98%)    PHYSICAL EXAM  General: A&Ox 3; NAD  Respiratory: CTA B/L  Cardiovascular: Regular tachycardia   Gastrointestinal: Soft; NTND   Extremities: WWP; pedal edema present improving     MEDICATIONS  (STANDING):  acetaminophen   Tablet .. 975 milliGRAM(s) Oral <User Schedule>  diphtheria/tetanus/pertussis (acellular) Vaccine (ADAcel) 0.5 milliLiter(s) IntraMuscular once  heparin   Injectable 5000 Unit(s) SubCutaneous every 12 hours  labetalol 200 milliGRAM(s) Oral every 12 hours  NIFEdipine XL 60 milliGRAM(s) Oral every 12 hours  oxytocin Infusion 333.333 milliUNIT(s)/Min (1000 mL/Hr) IV Continuous <Continuous>  prenatal multivitamin 1 Tablet(s) Oral daily    MEDICATIONS  (PRN):  diphenhydrAMINE 25 milliGRAM(s) Oral every 6 hours PRN Pruritus  lanolin Ointment 1 Application(s) Topical every 6 hours PRN Sore Nipples  magnesium hydroxide Suspension 30 milliLiter(s) Oral two times a day PRN Constipation  oxyCODONE    IR 5 milliGRAM(s) Oral every 3 hours PRN Moderate to Severe Pain (4-10)  oxyCODONE    IR 5 milliGRAM(s) Oral once PRN Moderate to Severe Pain (4-10)  simethicone 80 milliGRAM(s) Chew every 4 hours PRN Gas      LABS                        13.9   13.14 )-----------( 203      ( 13 May 2021 06:25 )             41.7     05-13    140  |  105  |  18  ----------------------------<  92  3.8   |  21<L>  |  1.34<H>    Ca    9.2      13 May 2021 06:25  Phos  4.6       Mg     2.9         TPro  6.7  /  Alb  3.5  /  TBili  0.3  /  DBili  x   /  AST  14  /  ALT  34  /  AlkPhos  151<H>      LIVER FUNCTIONS - ( 13 May 2021 06:25 )  Alb: 3.5 g/dL / Pro: 6.7 g/dL / ALK PHOS: 151 U/L / ALT: 34 U/L / AST: 14 U/L / GGT: x           Urinalysis Basic - ( 11 May 2021 13:56 )  Color: Yellow / Appearance: Clear / S.010 / pH: x  Gluc: x / Ketone: NEGATIVE  / Bili: Negative / Urobili: 0.2 E.U./dL   Blood: x / Protein: Trace mg/dL / Nitrite: NEGATIVE   Leuk Esterase: NEGATIVE / RBC: < 5 /HPF / WBC < 5 /HPF   Sq Epi: x / Non Sq Epi: 5-10 /HPF / Bacteria: Present /HPF
Patient evaluated at bedside this morning, resting comfortable in bed, with no acute events overnight.  She reports pain is well controlled with toradol and tylenol.  She denies headache, dizziness, chest pain, palpitations, shortness of breath, nausea, vomiting or heavy vaginal bleeding.  She has not tried ambulating since procedure, valle remains in place at this time. Tolerating clear liquids.     Physical Exam:  Vital Signs Last 24 Hrs  T(C): 36.7 (07 May 2021 05:50), Max: 37.4 (06 May 2021 18:00)  T(F): 98 (07 May 2021 05:50), Max: 99.4 (06 May 2021 18:00)  HR: 62 (07 May 2021 05:50) (59 - 92)  BP: 157/81 (07 May 2021 05:50) (106/68 - 157/81)  BP(mean): --  RR: 16 (07 May 2021 05:50) (16 - 20)  SpO2: 98% (07 May 2021 05:50) (95% - 99%)    GA: NAD, A+0 x 3  CV: RRR, no murmurs/rubs  Pulm: CTAB, no wheezes/rhonchi  Breasts: soft, nontender, no palpable masses  Abd: + BS, soft, nontender, nondistended, no rebound or guarding, incision clean, dry and intact, uterus firm at midline and below umbilicus  : valle in situ, lochia WNL  Extremities: no swelling or calf tenderness, reflexes +2 bilaterally, SCD in place                            12.0   13.83 )-----------( 111      ( 07 May 2021 05:52 )             35.9               acetaminophen   Tablet .. 975 milliGRAM(s) Oral <User Schedule>  diphenhydrAMINE 25 milliGRAM(s) Oral every 6 hours PRN  diphtheria/tetanus/pertussis (acellular) Vaccine (ADAcel) 0.5 milliLiter(s) IntraMuscular once  enoxaparin Injectable 40 milliGRAM(s) SubCutaneous every 24 hours  ibuprofen  Tablet. 600 milliGRAM(s) Oral every 6 hours  ketorolac   Injectable 30 milliGRAM(s) IV Push every 6 hours  lactated ringers. 1000 milliLiter(s) IV Continuous <Continuous>  lanolin Ointment 1 Application(s) Topical every 6 hours PRN  magnesium hydroxide Suspension 30 milliLiter(s) Oral two times a day PRN  oxyCODONE    IR 5 milliGRAM(s) Oral every 3 hours PRN  oxyCODONE    IR 5 milliGRAM(s) Oral once PRN  oxytocin Infusion 333.333 milliUNIT(s)/Min IV Continuous <Continuous>  prenatal multivitamin 1 Tablet(s) Oral daily  simethicone 80 milliGRAM(s) Chew every 4 hours PRN  
Patient evaluated at bedside this morning, resting comfortable in bed. Denies HA, scotoma, RUQ pain, n/v.   She reports pain is well controlled   She denies headache, dizziness, chest pain, palpitations, shortness of breathe, nausea, vomiting or heavy vaginal bleeding.  She has been ambulating without assistance, voiding spontaneously, passing gas, tolerating regular diet and is breastfeeding.    Physical Exam:  Vital Signs Last 24 Hrs  T(C): 36.6 (11 May 2021 14:59), Max: 36.6 (11 May 2021 14:59)  T(F): 97.8 (11 May 2021 14:59), Max: 97.8 (11 May 2021 14:59)  HR: 93 (12 May 2021 05:40) (68 - 108)  BP: 133/89 (12 May 2021 05:40) (128/86 - 169/91)  BP(mean): --  RR: 17 (11 May 2021 23:00) (16 - 17)  SpO2: 98% (12 May 2021 01:30) (98% - 99%)    GA: NAD, A+0 x 3  CV: RRR  Pulm: CTAB  Breasts: soft, nontender, no palpable masses  Abd: + BS, soft, nontender, nondistended, no rebound or guarding, incision clean, dry and intact, uterus firm at midline, 2 fb below umbilicus  : lochia WNL  Extremities: no swelling or calf tenderness                             12.4   11.70 )-----------( 151      ( 11 May 2021 06:23 )             38.4     05-11    139  |  106  |  18  ----------------------------<  111<H>  4.2   |  21<L>  |  1.40<H>    Ca    8.2<L>      11 May 2021 06:23  Mg     4.2     05-11    TPro  6.0  /  Alb  3.0<L>  /  TBili  0.3  /  DBili  x   /  AST  38  /  ALT  49<H>  /  AlkPhos  146<H>  05-11        
Patient evaluated at bedside this morning, resting comfortable in bed. Reports feeling well on the magnesium slightly flushed. Denies headaches/dizziness/scotoma/n/v/RUQ pain.   She reports pain is well controlled.   She denies headache, dizziness, chest pain, palpitations, shortness of breathe, nausea, vomiting or heavy vaginal bleeding.  She has been ambulating without assistance, voiding spontaneously, passing gas, tolerating regular diet and is breastfeeding.    Physical Exam:  Vital Signs Last 24 Hrs  T(C): 36.5 (10 May 2021 08:15), Max: 36.7 (09 May 2021 18:07)  T(F): 97.7 (10 May 2021 08:15), Max: 98 (09 May 2021 18:07)  HR: 97 (10 May 2021 08:15) (62 - 107)  BP: 127/87 (10 May 2021 08:15) (120/80 - 166/98)  BP(mean): 100 (10 May 2021 08:15) (100 - 100)  RR: 17 (10 May 2021 08:15) (16 - 20)  SpO2: 98% (10 May 2021 08:15) (96% - 100%)    GA: NAD, A+0 x 3  CV: RRR  Pulm: CTAB  Breasts: soft, nontender, no palpable masses  Abd: + BS, soft, nontender, nondistended, no rebound or guarding, incision clean, dry and intact, uterus firm at midline, 2 fb below umbilicus  : lochia WNL  Extremities: no swelling or calf tenderness                             11.6   10.26 )-----------( 119      ( 10 May 2021 07:29 )             34.7     05-10    x   |  x   |  x   ----------------------------<  86  x    |  x   |  x     Ca    8.5      10 May 2021 07:29          
Patient evaluated at bedside this morning, standing and walking around the room comfortably.   She reports pain is well controlled.  She denies headache, dizziness, chest pain, palpitations, shortness of breathe, nausea, vomiting or heavy vaginal bleeding. No visual changes or epigastric pain.  She has been ambulating without assistance, voiding spontaneously, passing gas, tolerating regular diet and is breastfeeding.    Physical Exam:  Vital Signs Last 24 Hrs  T(C): 36.6 (13 May 2021 06:00), Max: 36.9 (12 May 2021 22:00)  T(F): 97.9 (13 May 2021 06:00), Max: 98.5 (12 May 2021 22:00)  HR: 102 (13 May 2021 06:00) (102 - 114)  BP: 142/92 (13 May 2021 06:00) (115/76 - 142/92)  BP(mean): --  RR: 18 (13 May 2021 06:00) (16 - 18)  SpO2: 98% (13 May 2021 06:00) (96% - 98%)    GA: NAD, A+0 x 3  Abd: + BS, soft, nontender, nondistended, no rebound or guarding, incision clean, dry and intact, uterus firm at midline and below umbilicus, no RUQ tenderness.  : lochia WNL  Extremities: no swelling or calf tenderness                             13.9   13.14 )-----------( 203      ( 13 May 2021 06:25 )             41.7     05-13    140  |  105  |  18  ----------------------------<  92  3.8   |  21<L>  |  1.34<H>    Ca    9.2      13 May 2021 06:25  Phos  4.6     05-13  Mg     2.9     05-13    TPro  6.7  /  Alb  3.5  /  TBili  0.3  /  DBili  x   /  AST  14  /  ALT  34  /  AlkPhos  151<H>  05-13    Magnesium, Serum: 2.9 mg/dL (05-13 @ 06:25)      
Patient evaluated at bedside this morning, resting comfortable in bed. Denies headache, scotoma, n/v, RUQ pain.   She reports pain is well controlled   She denies headache, dizziness, chest pain, palpitations, shortness of breathe, nausea, vomiting or heavy vaginal bleeding.  She has been ambulating without assistance, voiding spontaneously, passing gas, tolerating regular diet and is breastfeeding.    Physical Exam:  Vital Signs Last 24 Hrs  T(C): 36.7 (11 May 2021 06:00), Max: 36.8 (10 May 2021 22:00)  T(F): 98.1 (11 May 2021 06:00), Max: 98.3 (11 May 2021 02:00)  HR: 93 (11 May 2021 06:00) (75 - 107)  BP: 148/84 (11 May 2021 06:00) (120/80 - 149/97)  BP(mean): 105 (10 May 2021 10:30) (100 - 105)  RR: 16 (11 May 2021 06:00) (16 - 18)  SpO2: 98% (11 May 2021 06:00) (95% - 100%)    GA: NAD, A+0 x 3  CV: RRR  Pulm: CTAB  Breasts: soft, nontender, no palpable masses  Abd: + BS, soft, nontender, nondistended, no rebound or guarding, incision clean, dry and intact, uterus firm at midline, 2 fb below umbilicus  : lochia WNL  Extremities: no swelling or calf tenderness                             12.4   11.70 )-----------( 151      ( 11 May 2021 06:23 )             38.4     05-11    139  |  106  |  18  ----------------------------<  111<H>  4.2   |  21<L>  |  1.40<H>    Ca    8.2<L>      11 May 2021 06:23  Mg     7.3     05-10    TPro  6.0  /  Alb  3.0<L>  /  TBili  0.3  /  DBili  x   /  AST  38  /  ALT  49<H>  /  AlkPhos  146<H>  05-11

## 2021-05-13 NOTE — DISCHARGE NOTE OB - CARE PROVIDER_API CALL
Alannah Lucas (DO; MS)  [facility]  [address] [place] [id number]  Phone: [phone]  Fax: [phone]  Follow Up Time:     Gretel Lynch)  Internal Medicine; Nephrology  [address] [place] [address]  Phone: [phone]  Fax: [phone]  Follow Up Time:

## 2021-05-13 NOTE — DISCHARGE NOTE OB - PLAN OF CARE
Good BP control # Monitor blood pressure three times a day. Please document the blood pressure values to review with obstetrician at follow-up appointment.   # If your blood pressure is equal to or greater than 160/110 (either one) OR if you experience any of the following symptoms: changes in vision, headache not relieved with Tylenol, severe abdominal pain, vomiting, increased vaginal bleeding, chest pain or shortness of breath, please return to the hospital.  # If your blood pressure is equal to or greater than 150/100 (either one), please call your obstetrician.  # Hold parameters: If the blood pressure is lower than 110/60 (either one) skip that dose of medication and inform your doctor.  # Please schedule an appointment at your physician office in one week. please call Dr. Florian's (the nephrologist) office for a telehealth visit in 1 week. She requests that you have a blood draw at that time as well to follow up on your labs which were elevated during your postpartum period. Happy and healthy mom and baby! Resolving! Please follow-up with your OB for a blood draw in 1-2weeks. Please follow-up with your OB doctor within 1-2 weeks for an incision check & BP check. You can resume a regular diet at home and you should continue your prenatal vitamins as directed. You can take Tylenol 650mg by mouth every 6 hours for pain as needed. Try to avoid Motrin if possible.    Please place nothing in the vagina for 6 weeks (no tampons, no sex, no douching, no baths, no hot tubs, no swimming pools, etc). If you have severe headaches and/or vision changes, heavy bleeding, chest pain, or shortness of breath, please call your provider or go to the nearest ED. Call your OB with any signs of symptoms of infection including fever > 100.4 degrees, severe pain, malodorous vaginal discharge or heavy bleeding requiring more than 1-2 pads/hour.

## 2023-01-01 NOTE — CHART NOTE - NSCHARTNOTEFT_GEN_A_CORE
Called for severe range /98 that occurred @0600. Went to bedside @0630 and repeated BP, which was severe 164/110. Patient no longer had an IV in place, so IR nifedipine 10mg was given @0644. Patient denies headache, dizziness, visual disturbances, nausea/vomiting, RUQ pain, epigastric pain, and SOB. Lungs CTAB. Bilateral biceps reflexes 2+. Repeat BP Called for severe range /98 that occurred @0600. Went to bedside @0630 and repeated BP, which was severe 164/110. Patient no longer had an IV in place, so IR Nifedipine 10mg was given @0644. Patient is well-appearing in NAD. She denies headache, dizziness, visual disturbances, nausea/vomiting, RUQ pain, epigastric pain, and SOB. Lungs CTAB. Bilateral biceps reflexes 2+. IV replaced and STAT full labs drawn. Dr. Patterson aware. Repeat BP after IR Nifedipine was 120/84. IV Mg 2g/h to start shortly. Nifedipine XL 30 qDay ordered with hold parameters. Continue to monitor.    Ariana Schofield MD PGY1  LOUIE Salas MD PGY3  LOUIE Patterson MD (1) Other Diagnosis